# Patient Record
Sex: FEMALE | Race: OTHER | HISPANIC OR LATINO
[De-identification: names, ages, dates, MRNs, and addresses within clinical notes are randomized per-mention and may not be internally consistent; named-entity substitution may affect disease eponyms.]

---

## 2017-06-28 ENCOUNTER — TRANSCRIPTION ENCOUNTER (OUTPATIENT)
Age: 37
End: 2017-06-28

## 2017-07-21 ENCOUNTER — TRANSCRIPTION ENCOUNTER (OUTPATIENT)
Age: 37
End: 2017-07-21

## 2018-07-28 ENCOUNTER — EMERGENCY (EMERGENCY)
Facility: HOSPITAL | Age: 38
LOS: 1 days | Discharge: HOME | End: 2018-07-28
Attending: EMERGENCY MEDICINE
Payer: COMMERCIAL

## 2018-07-28 VITALS
SYSTOLIC BLOOD PRESSURE: 123 MMHG | DIASTOLIC BLOOD PRESSURE: 65 MMHG | OXYGEN SATURATION: 100 % | HEART RATE: 69 BPM | TEMPERATURE: 98 F | RESPIRATION RATE: 17 BRPM

## 2018-07-28 VITALS — HEIGHT: 71 IN | WEIGHT: 225.09 LBS

## 2018-07-28 LAB
ALBUMIN SERPL ELPH-MCNC: 4.1 G/DL — SIGNIFICANT CHANGE UP (ref 3.3–5.2)
ALP SERPL-CCNC: 71 U/L — SIGNIFICANT CHANGE UP (ref 40–120)
ALT FLD-CCNC: 32 U/L — SIGNIFICANT CHANGE UP
ANION GAP SERPL CALC-SCNC: 12 MMOL/L — SIGNIFICANT CHANGE UP (ref 5–17)
APPEARANCE UR: CLEAR — SIGNIFICANT CHANGE UP
AST SERPL-CCNC: 28 U/L — SIGNIFICANT CHANGE UP
BASOPHILS # BLD AUTO: 0 K/UL — SIGNIFICANT CHANGE UP (ref 0–0.2)
BASOPHILS NFR BLD AUTO: 0.3 % — SIGNIFICANT CHANGE UP (ref 0–2)
BILIRUB SERPL-MCNC: 0.4 MG/DL — SIGNIFICANT CHANGE UP (ref 0.4–2)
BILIRUB UR-MCNC: NEGATIVE — SIGNIFICANT CHANGE UP
BUN SERPL-MCNC: 10 MG/DL — SIGNIFICANT CHANGE UP (ref 8–20)
CALCIUM SERPL-MCNC: 9.2 MG/DL — SIGNIFICANT CHANGE UP (ref 8.6–10.2)
CHLORIDE SERPL-SCNC: 102 MMOL/L — SIGNIFICANT CHANGE UP (ref 98–107)
CO2 SERPL-SCNC: 22 MMOL/L — SIGNIFICANT CHANGE UP (ref 22–29)
COLOR SPEC: YELLOW — SIGNIFICANT CHANGE UP
CREAT SERPL-MCNC: 0.6 MG/DL — SIGNIFICANT CHANGE UP (ref 0.5–1.3)
DIFF PNL FLD: ABNORMAL
EOSINOPHIL # BLD AUTO: 0.2 K/UL — SIGNIFICANT CHANGE UP (ref 0–0.5)
EOSINOPHIL NFR BLD AUTO: 2.3 % — SIGNIFICANT CHANGE UP (ref 0–6)
EPI CELLS # UR: SIGNIFICANT CHANGE UP
GLUCOSE SERPL-MCNC: 94 MG/DL — SIGNIFICANT CHANGE UP (ref 70–115)
GLUCOSE UR QL: NEGATIVE MG/DL — SIGNIFICANT CHANGE UP
HCG SERPL-ACNC: 130 MIU/ML — SIGNIFICANT CHANGE UP
HCG UR QL: POSITIVE
HCT VFR BLD CALC: 40.5 % — SIGNIFICANT CHANGE UP (ref 37–47)
HGB BLD-MCNC: 13.5 G/DL — SIGNIFICANT CHANGE UP (ref 12–16)
KETONES UR-MCNC: NEGATIVE — SIGNIFICANT CHANGE UP
LEUKOCYTE ESTERASE UR-ACNC: ABNORMAL
LYMPHOCYTES # BLD AUTO: 28.4 % — SIGNIFICANT CHANGE UP (ref 20–55)
LYMPHOCYTES # BLD AUTO: 3.2 K/UL — SIGNIFICANT CHANGE UP (ref 1–4.8)
MCHC RBC-ENTMCNC: 30.1 PG — SIGNIFICANT CHANGE UP (ref 27–31)
MCHC RBC-ENTMCNC: 33.3 G/DL — SIGNIFICANT CHANGE UP (ref 32–36)
MCV RBC AUTO: 90.4 FL — SIGNIFICANT CHANGE UP (ref 81–99)
MONOCYTES # BLD AUTO: 0.9 K/UL — HIGH (ref 0–0.8)
MONOCYTES NFR BLD AUTO: 7.8 % — SIGNIFICANT CHANGE UP (ref 3–10)
NEUTROPHILS # BLD AUTO: 6.8 K/UL — SIGNIFICANT CHANGE UP (ref 1.8–8)
NEUTROPHILS NFR BLD AUTO: 61 % — SIGNIFICANT CHANGE UP (ref 37–73)
NITRITE UR-MCNC: NEGATIVE — SIGNIFICANT CHANGE UP
PH UR: 6 — SIGNIFICANT CHANGE UP (ref 5–8)
PLATELET # BLD AUTO: 264 K/UL — SIGNIFICANT CHANGE UP (ref 150–400)
POTASSIUM SERPL-MCNC: 3.9 MMOL/L — SIGNIFICANT CHANGE UP (ref 3.5–5.3)
POTASSIUM SERPL-SCNC: 3.9 MMOL/L — SIGNIFICANT CHANGE UP (ref 3.5–5.3)
PROT SERPL-MCNC: 7.7 G/DL — SIGNIFICANT CHANGE UP (ref 6.6–8.7)
PROT UR-MCNC: 15 MG/DL
RBC # BLD: 4.48 M/UL — SIGNIFICANT CHANGE UP (ref 4.4–5.2)
RBC # FLD: 13.4 % — SIGNIFICANT CHANGE UP (ref 11–15.6)
RBC CASTS # UR COMP ASSIST: ABNORMAL /HPF (ref 0–4)
SODIUM SERPL-SCNC: 136 MMOL/L — SIGNIFICANT CHANGE UP (ref 135–145)
SP GR SPEC: 1.02 — SIGNIFICANT CHANGE UP (ref 1.01–1.02)
TYPE + AB SCN PNL BLD: SIGNIFICANT CHANGE UP
UROBILINOGEN FLD QL: NEGATIVE MG/DL — SIGNIFICANT CHANGE UP
WBC # BLD: 11.1 K/UL — HIGH (ref 4.8–10.8)
WBC # FLD AUTO: 11.1 K/UL — HIGH (ref 4.8–10.8)
WBC UR QL: SIGNIFICANT CHANGE UP

## 2018-07-28 PROCEDURE — 81001 URINALYSIS AUTO W/SCOPE: CPT

## 2018-07-28 PROCEDURE — 80053 COMPREHEN METABOLIC PANEL: CPT

## 2018-07-28 PROCEDURE — 81025 URINE PREGNANCY TEST: CPT

## 2018-07-28 PROCEDURE — 86850 RBC ANTIBODY SCREEN: CPT

## 2018-07-28 PROCEDURE — 99284 EMERGENCY DEPT VISIT MOD MDM: CPT

## 2018-07-28 PROCEDURE — 76817 TRANSVAGINAL US OBSTETRIC: CPT | Mod: 26

## 2018-07-28 PROCEDURE — 84702 CHORIONIC GONADOTROPIN TEST: CPT

## 2018-07-28 PROCEDURE — 76801 OB US < 14 WKS SINGLE FETUS: CPT

## 2018-07-28 PROCEDURE — 36415 COLL VENOUS BLD VENIPUNCTURE: CPT

## 2018-07-28 PROCEDURE — 86900 BLOOD TYPING SEROLOGIC ABO: CPT

## 2018-07-28 PROCEDURE — 76801 OB US < 14 WKS SINGLE FETUS: CPT | Mod: 26

## 2018-07-28 PROCEDURE — 85027 COMPLETE CBC AUTOMATED: CPT

## 2018-07-28 PROCEDURE — 86901 BLOOD TYPING SEROLOGIC RH(D): CPT

## 2018-07-28 PROCEDURE — 76817 TRANSVAGINAL US OBSTETRIC: CPT

## 2018-07-28 NOTE — ED PROVIDER NOTE - OBJECTIVE STATEMENT
This patient is a 38 year old woman A5 who presents to the ER c/o vaginal bleeding.  Patient states that 3 days ago she noticed light brown blood when doty wiped after using the bathroom.  She denies heavy vaginal bleeding.  2 days ago she fell on her right side and has been experiencing pelvic pain since the incident.  Patient was seen at [facility] 2 days ago and diagnosed with threatened .  HCG was 65 and ultrasound showed no IUP.

## 2018-07-28 NOTE — ED PROVIDER NOTE - MEDICAL DECISION MAKING DETAILS
Pelvic pain in pregnancy spotting 3 days ago.  Patient well appearing vitals stable.  No critical anemia,  IUP not visualized but  may be too early to visualize.  Patient given instructions to return to the ER immediately if pelvic pain or bleeding worsens, and to follow-up with OB or the ER for repeat HCG and US.

## 2018-07-28 NOTE — ED ADULT NURSE NOTE - OBJECTIVE STATEMENT
patient arrived to ED today with c/o hematuria.  Patient states she took home pregnancy test which was positive.

## 2018-07-28 NOTE — ED ADULT NURSE REASSESSMENT NOTE - NS ED NURSE REASSESS COMMENT FT1
Pt axox3 resting comfortably in PRA R. Pt states x 3 days ago she started to notice spotting with urination when she " wiped her private" Pt then fell x 2 days off the bed while playing with her kids? since has experienced same amount of vag. spotting and abd cramping. Pt seen and dc'd by good manpreet for threatened miscarriage. PT states LMP was about 3 weeks ago and has several pregnancy test  that are positive with her.
Pt axox3 resting comfortable, and has no complaints at this time. Pt to be dc'd home with obgyn follow up and agrees with plan of care. All results given to patient

## 2018-07-30 ENCOUNTER — EMERGENCY (EMERGENCY)
Facility: HOSPITAL | Age: 38
LOS: 1 days | Discharge: HOME | End: 2018-07-30
Attending: EMERGENCY MEDICINE
Payer: COMMERCIAL

## 2018-07-30 VITALS — WEIGHT: 225.09 LBS | HEIGHT: 69 IN

## 2018-07-30 LAB
ALBUMIN SERPL ELPH-MCNC: 4.4 G/DL — SIGNIFICANT CHANGE UP (ref 3.3–5.2)
ALP SERPL-CCNC: 66 U/L — SIGNIFICANT CHANGE UP (ref 40–120)
ALT FLD-CCNC: 29 U/L — SIGNIFICANT CHANGE UP
ANION GAP SERPL CALC-SCNC: 11 MMOL/L — SIGNIFICANT CHANGE UP (ref 5–17)
APPEARANCE UR: ABNORMAL
AST SERPL-CCNC: 24 U/L — SIGNIFICANT CHANGE UP
BASOPHILS # BLD AUTO: 0 K/UL — SIGNIFICANT CHANGE UP (ref 0–0.2)
BASOPHILS NFR BLD AUTO: 0.3 % — SIGNIFICANT CHANGE UP (ref 0–2)
BILIRUB SERPL-MCNC: 0.3 MG/DL — LOW (ref 0.4–2)
BILIRUB UR-MCNC: NEGATIVE — SIGNIFICANT CHANGE UP
BUN SERPL-MCNC: 10 MG/DL — SIGNIFICANT CHANGE UP (ref 8–20)
CALCIUM SERPL-MCNC: 9.3 MG/DL — SIGNIFICANT CHANGE UP (ref 8.6–10.2)
CHLORIDE SERPL-SCNC: 102 MMOL/L — SIGNIFICANT CHANGE UP (ref 98–107)
CO2 SERPL-SCNC: 26 MMOL/L — SIGNIFICANT CHANGE UP (ref 22–29)
COLOR SPEC: YELLOW — SIGNIFICANT CHANGE UP
COMMENT - URINE: SIGNIFICANT CHANGE UP
CREAT SERPL-MCNC: 0.71 MG/DL — SIGNIFICANT CHANGE UP (ref 0.5–1.3)
DIFF PNL FLD: ABNORMAL
EOSINOPHIL # BLD AUTO: 0.4 K/UL — SIGNIFICANT CHANGE UP (ref 0–0.5)
EOSINOPHIL NFR BLD AUTO: 2.9 % — SIGNIFICANT CHANGE UP (ref 0–6)
EPI CELLS # UR: ABNORMAL
GLUCOSE SERPL-MCNC: 87 MG/DL — SIGNIFICANT CHANGE UP (ref 70–115)
GLUCOSE UR QL: NEGATIVE MG/DL — SIGNIFICANT CHANGE UP
HCG SERPL-ACNC: 162.8 MIU/ML — SIGNIFICANT CHANGE UP
HCT VFR BLD CALC: 38.9 % — SIGNIFICANT CHANGE UP (ref 37–47)
HGB BLD-MCNC: 13.1 G/DL — SIGNIFICANT CHANGE UP (ref 12–16)
KETONES UR-MCNC: NEGATIVE — SIGNIFICANT CHANGE UP
LEUKOCYTE ESTERASE UR-ACNC: ABNORMAL
LYMPHOCYTES # BLD AUTO: 31.3 % — SIGNIFICANT CHANGE UP (ref 20–55)
LYMPHOCYTES # BLD AUTO: 4.5 K/UL — SIGNIFICANT CHANGE UP (ref 1–4.8)
MCHC RBC-ENTMCNC: 30.5 PG — SIGNIFICANT CHANGE UP (ref 27–31)
MCHC RBC-ENTMCNC: 33.7 G/DL — SIGNIFICANT CHANGE UP (ref 32–36)
MCV RBC AUTO: 90.7 FL — SIGNIFICANT CHANGE UP (ref 81–99)
MONOCYTES # BLD AUTO: 1.2 K/UL — HIGH (ref 0–0.8)
MONOCYTES NFR BLD AUTO: 8 % — SIGNIFICANT CHANGE UP (ref 3–10)
NEUTROPHILS # BLD AUTO: 8.2 K/UL — HIGH (ref 1.8–8)
NEUTROPHILS NFR BLD AUTO: 57.2 % — SIGNIFICANT CHANGE UP (ref 37–73)
NITRITE UR-MCNC: NEGATIVE — SIGNIFICANT CHANGE UP
PH UR: 8 — SIGNIFICANT CHANGE UP (ref 5–8)
PLATELET # BLD AUTO: 274 K/UL — SIGNIFICANT CHANGE UP (ref 150–400)
POTASSIUM SERPL-MCNC: 3.8 MMOL/L — SIGNIFICANT CHANGE UP (ref 3.5–5.3)
POTASSIUM SERPL-SCNC: 3.8 MMOL/L — SIGNIFICANT CHANGE UP (ref 3.5–5.3)
PROT SERPL-MCNC: 7.4 G/DL — SIGNIFICANT CHANGE UP (ref 6.6–8.7)
PROT UR-MCNC: 30 MG/DL
RBC # BLD: 4.29 M/UL — LOW (ref 4.4–5.2)
RBC # FLD: 13.5 % — SIGNIFICANT CHANGE UP (ref 11–15.6)
RBC CASTS # UR COMP ASSIST: ABNORMAL /HPF (ref 0–4)
SODIUM SERPL-SCNC: 139 MMOL/L — SIGNIFICANT CHANGE UP (ref 135–145)
SP GR SPEC: 1.02 — SIGNIFICANT CHANGE UP (ref 1.01–1.02)
UROBILINOGEN FLD QL: NEGATIVE MG/DL — SIGNIFICANT CHANGE UP
WBC # BLD: 14.4 K/UL — HIGH (ref 4.8–10.8)
WBC # FLD AUTO: 14.4 K/UL — HIGH (ref 4.8–10.8)
WBC UR QL: SIGNIFICANT CHANGE UP

## 2018-07-30 PROCEDURE — 99284 EMERGENCY DEPT VISIT MOD MDM: CPT

## 2018-07-30 RX ORDER — SODIUM CHLORIDE 9 MG/ML
1000 INJECTION INTRAMUSCULAR; INTRAVENOUS; SUBCUTANEOUS ONCE
Qty: 0 | Refills: 0 | Status: COMPLETED | OUTPATIENT
Start: 2018-07-30 | End: 2018-07-30

## 2018-07-30 RX ADMIN — SODIUM CHLORIDE 1000 MILLILITER(S): 9 INJECTION INTRAMUSCULAR; INTRAVENOUS; SUBCUTANEOUS at 22:53

## 2018-07-30 NOTE — ED PROVIDER NOTE - PHYSICAL EXAMINATION
Const: Awake, alert and oriented. In no acute distress. Well appearing.  HEENT: NC/AT. Moist mucous membranes.  Eyes: No scleral icterus. EOMI.  Neck:. Soft and supple. Full ROM without pain.  Cardiac:  +S1/S2. No murmurs. Peripheral pulses 2+ and symmetric.   Resp: Speaking in full sentences. No evidence of respiratory distress. No wheezes, rales or rhonchi.  Abd: Soft, lower suprapubic tenderness on palpation, non-distended. Normal bowel sounds in all 4 quadrants. No guarding or rebound.  Back: Spine midline and non-tender. No CVAT.  Skin: No rashes, abrasions or lacerations.  Lymph: No cervical lymphadenopathy.  Neuro: Awake, alert & oriented x 3. Moves all extremities symmetrically.

## 2018-07-30 NOTE — ED PROVIDER NOTE - PROGRESS NOTE DETAILS
ob/gyn consulted after reviewing ultrasound, lab work, and urine ob/gyn saw patient, copy of results printed for pt, pt explained results and d/c instructions

## 2018-07-30 NOTE — ED PROVIDER NOTE - OBJECTIVE STATEMENT
Patient is a 39 y/o female c/o of vaginal bleeding A5 c/o of vaginal bleeding that onset around one week ago. Patient states bleeding has become heavier in the past two days. Patient was seen at [facility] two days ago for the same complaint and dx with threatened . Patient states she is also having lower pelvic pain and states she fell four days ago on her right side, having pain in pelvic region every since. Patient denies cp, SOB, dysuria, nausea, vomiting, fevers.

## 2018-07-31 VITALS
DIASTOLIC BLOOD PRESSURE: 74 MMHG | TEMPERATURE: 98 F | SYSTOLIC BLOOD PRESSURE: 108 MMHG | HEART RATE: 77 BPM | RESPIRATION RATE: 19 BRPM | OXYGEN SATURATION: 97 %

## 2018-07-31 DIAGNOSIS — O20.0 THREATENED ABORTION: ICD-10-CM

## 2018-07-31 LAB
BLD GP AB SCN SERPL QL: SIGNIFICANT CHANGE UP
TYPE + AB SCN PNL BLD: SIGNIFICANT CHANGE UP

## 2018-07-31 PROCEDURE — 80053 COMPREHEN METABOLIC PANEL: CPT

## 2018-07-31 PROCEDURE — 99284 EMERGENCY DEPT VISIT MOD MDM: CPT

## 2018-07-31 PROCEDURE — 81001 URINALYSIS AUTO W/SCOPE: CPT

## 2018-07-31 PROCEDURE — 86900 BLOOD TYPING SEROLOGIC ABO: CPT

## 2018-07-31 PROCEDURE — 84702 CHORIONIC GONADOTROPIN TEST: CPT

## 2018-07-31 PROCEDURE — 86901 BLOOD TYPING SEROLOGIC RH(D): CPT

## 2018-07-31 PROCEDURE — 85027 COMPLETE CBC AUTOMATED: CPT

## 2018-07-31 PROCEDURE — 87086 URINE CULTURE/COLONY COUNT: CPT

## 2018-07-31 PROCEDURE — 76815 OB US LIMITED FETUS(S): CPT

## 2018-07-31 PROCEDURE — 76801 OB US < 14 WKS SINGLE FETUS: CPT | Mod: 26

## 2018-07-31 PROCEDURE — 86850 RBC ANTIBODY SCREEN: CPT

## 2018-07-31 PROCEDURE — 36415 COLL VENOUS BLD VENIPUNCTURE: CPT

## 2018-07-31 PROCEDURE — 76801 OB US < 14 WKS SINGLE FETUS: CPT

## 2018-07-31 NOTE — CONSULT NOTE ADULT - PROBLEM SELECTOR RECOMMENDATION 9
Please follow up with your clinic appointment tomorrow. Take a copy of your labwork and sonogram results as well. You will need to repeat the HCG quant and sonogram to determine if the fetus is growing appropriately.

## 2018-07-31 NOTE — CONSULT NOTE ADULT - ASSESSMENT
39yo  at 3w0d by LMP of 7/10/18 with threatened AB. H/H stable and VS WNL. No signs of acute abdomen. BHCG is inappropriately rising from 130 to 162, however, given the US results, it is still too early to determine the viability of this pregnancy.   Vaginal bleeding likely due to implantation bleeding.   Pt has an appointment with her clinic tomorrow morning. She should inform them of the results of the labwork and sonogram and follow up with them as necessary.

## 2018-07-31 NOTE — CONSULT NOTE ADULT - SUBJECTIVE AND OBJECTIVE BOX
GREGORIO STUART is a 37yo  at 3w0d by LMP of 7/10/18 returns to the ED with complaints of crampy abdominal pain and vaginal bleeding. 5 days ago, she was seen at [facility] for similar complaints - HCG 65 and no IUP. Pt was seen in our ED 2 days ago for similar complaints -  and no IUP. She came back today because she noticed more bleeding, states she saw some fresh and old blood while wiping. Has gone through 1 pad in one day. Denies dysuria, frequency, nausea, vomiting, fever, or changes in bowel movements.     PAST OBSTETRICAL HISTORY: eTOP x5, treated with D&C. 2x     PAST GYN HISTORY: Denies history of abnormal paps, STDs, or uterine fibroids.     PAST MEDICAL HISTORY:  No pertinent past medical history    PAST SURGICAL HISTORY:  D&C x5, dates vary from 3727-7001    MEDS: none    SOCIAL:  Denies tobacco, alcohol, or drug use. Feels safe at home.     Allergies: No Known Allergies    Intolerances    Vital Signs Last 24 Hrs  T(C): 36.8 (2018 21:08), Max: 36.8 (2018 21:08)  T(F): 98.3 (2018 21:08), Max: 98.3 (2018 21:08)  HR: 82 (2018 21:08) (82 - 82)  BP: 104/69 (2018 21:08) (104/69 - 104/69)  BP(mean): --  RR: 18 (2018 21:08) (18 - 18)  SpO2: 96% (2018 21:08) (96% - 96%)    PHYSICAL EXAM:  GENERAL: NAD  CHEST/LUNG: Clear to percussion bilaterally; No rales, rhonchi, wheezing, or rubs  HEART: S1S2, RRR  ABDOMEN: Soft, Nontender, Nondistended; Bowel sounds present, no rebounding or guarding    LABS:                        13.1   14.4  )-----------( 274      ( 2018 23:13 )             38.9     07-30    139  |  102  |  10.0  ----------------------------<  87  3.8   |  26.0  |  0.71    Ca    9.3      2018 23:13    TPro  7.4  /  Alb  4.4  /  TBili  0.3<L>  /  DBili  x   /  AST  24  /  ALT  29  /  AlkPhos  66  07-30    Urinalysis Basic - ( 2018 22:46 )    Color: Yellow / Appearance: Slightly Turbid / S.020 / pH: x  Gluc: x / Ketone: Negative  / Bili: Negative / Urobili: Negative mg/dL   Blood: x / Protein: 30 mg/dL / Nitrite: Negative   Leuk Esterase: Small / RBC: 3-5 /HPF / WBC 3-5   Sq Epi: x / Non Sq Epi: Moderate / Bacteria: x    HCG Quantitative, Serum (18 @ 23:13)    HCG Quantitative, Serum: 162.8      HCG Quantitative, Serum (18 @ 12:07)    HCG Quantitative, Serum: 130.0    RADIOLOGY STUDIES:  < from: US Echo OB <=14 Weeks, First Gestation (18 @ 00:07) >  IMPRESSION:  Too small to characterize cystic focus measuring up to 2 mm in the   endometrialecho complex without fetal pole identified. Differential   includes early pregnancy, missed  or ectopic pregnancy though no   adnexal masses are demonstrated. Correlate with serial beta-hCG and   short-term imaging follow-up is advised.    < end of copied text >

## 2018-07-31 NOTE — ED ADULT NURSE REASSESSMENT NOTE - NS ED NURSE REASSESS COMMENT FT1
OB at bedside
pt feels better ready for discharge, IV removed, VSS, discharge instructions reviewed, pt indicated understanding

## 2018-08-01 LAB
CULTURE RESULTS: SIGNIFICANT CHANGE UP
SPECIMEN SOURCE: SIGNIFICANT CHANGE UP

## 2018-08-02 RX ORDER — CEPHALEXIN 500 MG
1 CAPSULE ORAL
Qty: 28 | Refills: 0
Start: 2018-08-02 | End: 2018-08-08

## 2018-08-02 NOTE — ED POST DISCHARGE NOTE - RESULT SUMMARY
+ bacteria in urine C&S, I s/w patient, she is aware that she needs to f/u with GYN, keflex qid x 7 days sent to pharmacy.

## 2018-08-05 ENCOUNTER — EMERGENCY (EMERGENCY)
Facility: HOSPITAL | Age: 38
LOS: 1 days | Discharge: HOME | End: 2018-08-05
Attending: EMERGENCY MEDICINE
Payer: COMMERCIAL

## 2018-08-05 VITALS
TEMPERATURE: 99 F | RESPIRATION RATE: 18 BRPM | OXYGEN SATURATION: 96 % | DIASTOLIC BLOOD PRESSURE: 66 MMHG | SYSTOLIC BLOOD PRESSURE: 121 MMHG | HEART RATE: 11 BPM

## 2018-08-05 VITALS — HEIGHT: 66 IN | WEIGHT: 229.94 LBS

## 2018-08-05 DIAGNOSIS — Q36.0 CLEFT LIP, BILATERAL: Chronic | ICD-10-CM

## 2018-08-05 PROCEDURE — 99283 EMERGENCY DEPT VISIT LOW MDM: CPT | Mod: 25

## 2018-08-05 PROCEDURE — 10061 I&D ABSCESS COMP/MULTIPLE: CPT

## 2018-08-05 RX ADMIN — Medication 1 TABLET(S): at 22:23

## 2018-08-05 NOTE — ED PROVIDER NOTE - MUSCULOSKELETAL, MLM
Spine appears normal, range of motion is not limited, no muscle or joint tenderness. Large abscess in L groin.

## 2018-08-05 NOTE — ED ADULT TRIAGE NOTE - CHIEF COMPLAINT QUOTE
"I have groin pain on my left side that started Friday and I have a hard time sitting" Pt denies injury,. PT A & Ox4.

## 2018-08-05 NOTE — ED ADULT NURSE NOTE - NSIMPLEMENTINTERV_GEN_ALL_ED
Implemented All Universal Safety Interventions:  [place] to call system. Call bell, personal items and telephone within reach. Instruct patient to call for assistance. Room bathroom lighting operational. Non-slip footwear when patient is off stretcher. Physically safe environment: no spills, clutter or unnecessary equipment. Stretcher in lowest position, wheels locked, appropriate side rails in place.

## 2018-08-05 NOTE — ED PROVIDER NOTE - OBJECTIVE STATEMENT
37 y/o F pt with medical hx of cleft palette presents to ED c/o lump in L groin. Pt reports that she had a small bump 2 days ago, and started scratching it, she then realized it starting to become swollen. No further complaints at this time.

## 2018-08-07 ENCOUNTER — EMERGENCY (EMERGENCY)
Facility: HOSPITAL | Age: 38
LOS: 1 days | Discharge: HOME | End: 2018-08-07
Attending: EMERGENCY MEDICINE
Payer: COMMERCIAL

## 2018-08-07 VITALS
HEART RATE: 74 BPM | OXYGEN SATURATION: 96 % | SYSTOLIC BLOOD PRESSURE: 116 MMHG | TEMPERATURE: 99 F | DIASTOLIC BLOOD PRESSURE: 77 MMHG | RESPIRATION RATE: 18 BRPM

## 2018-08-07 VITALS — HEIGHT: 69 IN | WEIGHT: 225.09 LBS

## 2018-08-07 DIAGNOSIS — Q36.0 CLEFT LIP, BILATERAL: Chronic | ICD-10-CM

## 2018-08-07 PROCEDURE — 99282 EMERGENCY DEPT VISIT SF MDM: CPT

## 2018-08-07 NOTE — ED PROVIDER NOTE - ATTENDING CONTRIBUTION TO CARE
female patient seen and evaluated with ACP  Presents to Ed for packing removal  seen at this ED, I&D of abscess with packing at that time  no fever, chills  vitals reviewed, exam as documented  packing removed, tolerated well  f/u as instructed

## 2018-08-07 NOTE — ED PROVIDER NOTE - MEDICAL DECISION MAKING DETAILS
PT with stable VS no acute distress, improving wound pt using warm soaks taking abx has follow up for 2/28 no actively draining no surrounding cellulitis, packing removed pt wll be Dc home with contiued wound care, educated about when to return to the ED if needed. PT verbalizes that he understands all instructions and results.

## 2018-08-07 NOTE — ED PROVIDER NOTE - OBJECTIVE STATEMENT
PT with no SPMHx presents to the ED with complaint of packing removal of abscess that was drained in [facility] 2 days ago. PT stats that she had a abscess that was slowly developing I&D she has followed post procedure care as instructed with warm compressions, ABx. PT states that she has had small amount of drainage from the wound, has had a decreased amount of swelling and redness at the sight. PT denies fever, chills, weakness, numbness, tingling, HA, dizziness, n/v.

## 2018-11-21 ENCOUNTER — APPOINTMENT (OUTPATIENT)
Dept: OBGYN | Facility: CLINIC | Age: 38
End: 2018-11-21
Payer: MEDICAID

## 2018-11-21 DIAGNOSIS — Z78.9 OTHER SPECIFIED HEALTH STATUS: ICD-10-CM

## 2018-11-21 LAB
HCG UR QL: NEGATIVE
QUALITY CONTROL: YES

## 2018-11-21 PROCEDURE — 81025 URINE PREGNANCY TEST: CPT

## 2018-11-21 PROCEDURE — 99203 OFFICE O/P NEW LOW 30 MIN: CPT | Mod: TH

## 2018-11-27 LAB
C TRACH RRNA SPEC QL NAA+PROBE: NOT DETECTED
CYTOLOGY CVX/VAG DOC THIN PREP: NORMAL
CYTOLOGY CVX/VAG DOC THIN PREP: NORMAL
HPV HIGH+LOW RISK DNA PNL CVX: NOT DETECTED
N GONORRHOEA RRNA SPEC QL NAA+PROBE: NOT DETECTED
SOURCE TP AMPLIFICATION: NORMAL

## 2018-11-29 ENCOUNTER — ASOB RESULT (OUTPATIENT)
Age: 38
End: 2018-11-29

## 2018-11-29 ENCOUNTER — APPOINTMENT (OUTPATIENT)
Dept: ANTEPARTUM | Facility: CLINIC | Age: 38
End: 2018-11-29
Payer: MEDICAID

## 2018-11-29 PROCEDURE — 76817 TRANSVAGINAL US OBSTETRIC: CPT

## 2018-11-30 ENCOUNTER — MESSAGE (OUTPATIENT)
Age: 38
End: 2018-11-30

## 2018-11-30 ENCOUNTER — EMERGENCY (EMERGENCY)
Facility: HOSPITAL | Age: 38
LOS: 1 days | Discharge: HOME | End: 2018-11-30
Attending: EMERGENCY MEDICINE
Payer: COMMERCIAL

## 2018-11-30 VITALS
OXYGEN SATURATION: 97 % | RESPIRATION RATE: 18 BRPM | WEIGHT: 225.09 LBS | HEART RATE: 85 BPM | DIASTOLIC BLOOD PRESSURE: 77 MMHG | HEIGHT: 69 IN | TEMPERATURE: 98 F | SYSTOLIC BLOOD PRESSURE: 121 MMHG

## 2018-11-30 DIAGNOSIS — Q36.0 CLEFT LIP, BILATERAL: Chronic | ICD-10-CM

## 2018-11-30 LAB
ALBUMIN SERPL ELPH-MCNC: 4.5 G/DL — SIGNIFICANT CHANGE UP (ref 3.3–5.2)
ALP SERPL-CCNC: 71 U/L — SIGNIFICANT CHANGE UP (ref 40–120)
ALT FLD-CCNC: 25 U/L — SIGNIFICANT CHANGE UP
ANION GAP SERPL CALC-SCNC: 12 MMOL/L — SIGNIFICANT CHANGE UP (ref 5–17)
APPEARANCE UR: ABNORMAL
AST SERPL-CCNC: 23 U/L — SIGNIFICANT CHANGE UP
BACTERIA # UR AUTO: ABNORMAL
BASOPHILS # BLD AUTO: 0 K/UL — SIGNIFICANT CHANGE UP (ref 0–0.2)
BASOPHILS NFR BLD AUTO: 0.3 % — SIGNIFICANT CHANGE UP (ref 0–2)
BILIRUB SERPL-MCNC: 0.3 MG/DL — LOW (ref 0.4–2)
BILIRUB UR-MCNC: NEGATIVE — SIGNIFICANT CHANGE UP
BLD GP AB SCN SERPL QL: SIGNIFICANT CHANGE UP
BUN SERPL-MCNC: 9 MG/DL — SIGNIFICANT CHANGE UP (ref 8–20)
CALCIUM SERPL-MCNC: 9.8 MG/DL — SIGNIFICANT CHANGE UP (ref 8.6–10.2)
CHLORIDE SERPL-SCNC: 103 MMOL/L — SIGNIFICANT CHANGE UP (ref 98–107)
CO2 SERPL-SCNC: 27 MMOL/L — SIGNIFICANT CHANGE UP (ref 22–29)
COD CRY URNS QL: ABNORMAL
COLOR SPEC: YELLOW — SIGNIFICANT CHANGE UP
COMMENT - URINE: SIGNIFICANT CHANGE UP
CREAT SERPL-MCNC: 0.71 MG/DL — SIGNIFICANT CHANGE UP (ref 0.5–1.3)
DIFF PNL FLD: ABNORMAL
EOSINOPHIL # BLD AUTO: 0.2 K/UL — SIGNIFICANT CHANGE UP (ref 0–0.5)
EOSINOPHIL NFR BLD AUTO: 2.1 % — SIGNIFICANT CHANGE UP (ref 0–6)
EPI CELLS # UR: SIGNIFICANT CHANGE UP
GLUCOSE SERPL-MCNC: 94 MG/DL — SIGNIFICANT CHANGE UP (ref 70–115)
GLUCOSE UR QL: NEGATIVE MG/DL — SIGNIFICANT CHANGE UP
HCG SERPL-ACNC: 2555 MIU/ML — HIGH
HCT VFR BLD CALC: 38.2 % — SIGNIFICANT CHANGE UP (ref 37–47)
HGB BLD-MCNC: 12.6 G/DL — SIGNIFICANT CHANGE UP (ref 12–16)
KETONES UR-MCNC: NEGATIVE — SIGNIFICANT CHANGE UP
LEUKOCYTE ESTERASE UR-ACNC: ABNORMAL
LYMPHOCYTES # BLD AUTO: 29.6 % — SIGNIFICANT CHANGE UP (ref 20–55)
LYMPHOCYTES # BLD AUTO: 3.6 K/UL — SIGNIFICANT CHANGE UP (ref 1–4.8)
MCHC RBC-ENTMCNC: 30.4 PG — SIGNIFICANT CHANGE UP (ref 27–31)
MCHC RBC-ENTMCNC: 33 G/DL — SIGNIFICANT CHANGE UP (ref 32–36)
MCV RBC AUTO: 92 FL — SIGNIFICANT CHANGE UP (ref 81–99)
MONOCYTES # BLD AUTO: 1 K/UL — HIGH (ref 0–0.8)
MONOCYTES NFR BLD AUTO: 8.6 % — SIGNIFICANT CHANGE UP (ref 3–10)
NEUTROPHILS # BLD AUTO: 7.2 K/UL — SIGNIFICANT CHANGE UP (ref 1.8–8)
NEUTROPHILS NFR BLD AUTO: 59.2 % — SIGNIFICANT CHANGE UP (ref 37–73)
NITRITE UR-MCNC: NEGATIVE — SIGNIFICANT CHANGE UP
PH UR: 6 — SIGNIFICANT CHANGE UP (ref 5–8)
PLATELET # BLD AUTO: 256 K/UL — SIGNIFICANT CHANGE UP (ref 150–400)
POTASSIUM SERPL-MCNC: 3.9 MMOL/L — SIGNIFICANT CHANGE UP (ref 3.5–5.3)
POTASSIUM SERPL-SCNC: 3.9 MMOL/L — SIGNIFICANT CHANGE UP (ref 3.5–5.3)
PROT SERPL-MCNC: 7.6 G/DL — SIGNIFICANT CHANGE UP (ref 6.6–8.7)
PROT UR-MCNC: 30 MG/DL
RBC # BLD: 4.15 M/UL — LOW (ref 4.4–5.2)
RBC # FLD: 14.2 % — SIGNIFICANT CHANGE UP (ref 11–15.6)
RBC CASTS # UR COMP ASSIST: ABNORMAL /HPF (ref 0–4)
SODIUM SERPL-SCNC: 142 MMOL/L — SIGNIFICANT CHANGE UP (ref 135–145)
SP GR SPEC: 1.02 — SIGNIFICANT CHANGE UP (ref 1.01–1.02)
TYPE + AB SCN PNL BLD: SIGNIFICANT CHANGE UP
UROBILINOGEN FLD QL: 1 MG/DL
WBC # BLD: 12.2 K/UL — HIGH (ref 4.8–10.8)
WBC # FLD AUTO: 12.2 K/UL — HIGH (ref 4.8–10.8)
WBC UR QL: SIGNIFICANT CHANGE UP

## 2018-11-30 PROCEDURE — 99284 EMERGENCY DEPT VISIT MOD MDM: CPT

## 2018-11-30 PROCEDURE — 76817 TRANSVAGINAL US OBSTETRIC: CPT | Mod: 26

## 2018-11-30 PROCEDURE — 76801 OB US < 14 WKS SINGLE FETUS: CPT | Mod: 26

## 2018-11-30 RX ORDER — ACETAMINOPHEN 500 MG
975 TABLET ORAL ONCE
Qty: 0 | Refills: 0 | Status: COMPLETED | OUTPATIENT
Start: 2018-11-30 | End: 2018-11-30

## 2018-11-30 RX ADMIN — Medication 975 MILLIGRAM(S): at 22:38

## 2018-11-30 RX ADMIN — Medication 975 MILLIGRAM(S): at 20:22

## 2018-11-30 NOTE — ED STATDOCS - OBJECTIVE STATEMENT
39 y/o F pt 5 weeks pregnant with PMHx  with miscarriage x1, clef palat repair presents to the ED c/o sudden onset vaginal bleeding today.  Pt notes associated suprapubic cramping.  Pt has known pregnancy by urine and US.  She last saw her GYN yesterday in his office and had an US.  She is taking pre- medications.  Pt is tolerating PO, last ate at 16:30 today.  Denies fever, chills, N/V/D, dysuria.  No further acute complaints at this time.  OB/GYN: Dr. Olsen

## 2018-11-30 NOTE — ED STATDOCS - ATTENDING CONTRIBUTION TO CARE
I, Arnulfo Rangel, performed the initial face to face bedside interview with this patient regarding history of present illness, review of symptoms and relevant past medical, social and family history.  I completed an independent physical examination.  I was the initial provider who evaluated this patient. I have signed out the follow up of any pending tests (i.e. labs, radiological studies) to the ACP.  I have communicated the patient’s plan of care and disposition with the ACP.

## 2018-11-30 NOTE — ED STATDOCS - MEDICAL DECISION MAKING DETAILS
suprapubic cramping/vag spotting, know pregnancy, about 5 weeks by date, line, labs, HCG/US to eval for early/ectopic/threatened, type and screen for rh incompatibility, check results, reassess, discuss with Dr. Olsen

## 2018-11-30 NOTE — ED STATDOCS - PHYSICAL EXAMINATION
Constitutional: in no apparent distress, speaking in full sentences  HEENT: neck supple, FROM, tongue is pink, moist and midline  EYES: non-injected, non-icteric, PERRL, EOMI  RESPIRATORY: lungs clear  CARDIAC: S1 S2, regular rate, moving chest wall symmetrically, no pedal edema  GI: bowel sounds present, abdomen soft, L suprapubic tenderness with deep palpation, no rebound, no guarding  : no CVA tenderness  MSK: spine is midline, no calf tenderness  SKIN: no jaundice  NEURO: awake and alert

## 2018-11-30 NOTE — ED STATDOCS - PROGRESS NOTE DETAILS
PA Note: Pt evaluated at bedside, c/o vaginal bleeding and suprapubic cramping today (11/30/18) since 6:30pm.  Pt states she is not currently bleeding and the bleeding was limited to when she wiped with toilet paper.  Pt unsure of date of LMP, did not take any medications for pain today, denies new sexual partners.  Pt states her US was normal yesterday at OB/GYN. Pt evaluated prior by intake physician. Otherwise HPI/PE/ROS as noted above. Will follow up plan per intake physician Patient signed out to AUGIE Mcdonald pt has  US done - IUP- + UTI/ keflex and UC ordered - called OB resident Dr Boston  who spoke with DR bryant will F/u consult as per OB resident pt  can be D/c F/u in clinic 2 days with perceptions- will Tx the UTI

## 2018-11-30 NOTE — ED STATDOCS - HISTORY ATTESTATION, MLM
I have reviewed and confirmed nurses' notes... I personally performed the service described in the documentation recorded by the scribe in my presence, and it accurately and completely records my words and actions.

## 2018-11-30 NOTE — ED ADULT NURSE NOTE - NSIMPLEMENTINTERV_GEN_ALL_ED
Implemented All Universal Safety Interventions:  [place] to call system. Call bell, personal items and telephone within reach. Instruct patient to call for assistance. Room bathroom lighting operational. Non-slip footwear when patient is off stretcher. Physically safe environment: no spills, clutter or unnecessary equipment. Stretcher in lowest position, wheels locked, appropriate side rails in place.

## 2018-11-30 NOTE — ED STATDOCS - CARE PLAN
Principal Discharge DX:	Vaginal bleeding affecting early pregnancy  Secondary Diagnosis:	Urinary tract infection with hematuria, site unspecified

## 2018-12-01 VITALS
SYSTOLIC BLOOD PRESSURE: 120 MMHG | RESPIRATION RATE: 18 BRPM | HEART RATE: 78 BPM | OXYGEN SATURATION: 99 % | DIASTOLIC BLOOD PRESSURE: 68 MMHG

## 2018-12-01 DIAGNOSIS — O02.1 MISSED ABORTION: ICD-10-CM

## 2018-12-01 PROCEDURE — 86901 BLOOD TYPING SEROLOGIC RH(D): CPT

## 2018-12-01 PROCEDURE — 87086 URINE CULTURE/COLONY COUNT: CPT

## 2018-12-01 PROCEDURE — 86850 RBC ANTIBODY SCREEN: CPT

## 2018-12-01 PROCEDURE — 36415 COLL VENOUS BLD VENIPUNCTURE: CPT

## 2018-12-01 PROCEDURE — 76817 TRANSVAGINAL US OBSTETRIC: CPT

## 2018-12-01 PROCEDURE — 86900 BLOOD TYPING SEROLOGIC ABO: CPT

## 2018-12-01 PROCEDURE — 99284 EMERGENCY DEPT VISIT MOD MDM: CPT

## 2018-12-01 PROCEDURE — 76801 OB US < 14 WKS SINGLE FETUS: CPT

## 2018-12-01 PROCEDURE — 80053 COMPREHEN METABOLIC PANEL: CPT

## 2018-12-01 PROCEDURE — 84702 CHORIONIC GONADOTROPIN TEST: CPT

## 2018-12-01 PROCEDURE — 85027 COMPLETE CBC AUTOMATED: CPT

## 2018-12-01 PROCEDURE — 81001 URINALYSIS AUTO W/SCOPE: CPT

## 2018-12-01 RX ORDER — CEPHALEXIN 500 MG
1 CAPSULE ORAL
Qty: 21 | Refills: 0
Start: 2018-12-01 | End: 2018-12-07

## 2018-12-01 RX ORDER — CEPHALEXIN 500 MG
500 CAPSULE ORAL ONCE
Qty: 0 | Refills: 0 | Status: COMPLETED | OUTPATIENT
Start: 2018-12-01 | End: 2018-12-01

## 2018-12-01 RX ADMIN — Medication 500 MILLIGRAM(S): at 00:52

## 2018-12-01 NOTE — CONSULT NOTE ADULT - SUBJECTIVE AND OBJECTIVE BOX
38y    Last Menstrual Period 2018  presents with sudden onset vaginal spotting today. Saw it twice when wiping after urinating. Denies weakness or lightheadedness. Reports mild intermittent lower abdominal pain.     PAST MEDICAL HISTORY:  No pertinent past medical history    PAST SURGICAL HISTORY:  Bilateral cleft lip, complete: during childhood  No significant past surgical history    PAST OBSTETRICAL HISTORY: VD x2, no complications. SAB 3 months ago.     PAST GYN HISTORY: none    SOCIAL:  never smoker, social drinker, no history of recreational drug use     No Known Allergies    Vital Signs Last 24 Hrs  T(C): 36.4 (2018 17:55), Max: 36.4 (2018 17:55)  T(F): 97.6 (2018 17:55), Max: 97.6 (2018 17:55)  HR: 85 (2018 17:55) (85 - 85)  BP: 121/77 (2018 17:55) (121/77 - 121/77)  RR: 18 (2018 17:55) (18 - 18)  SpO2: 97% (2018 17:55) (97% - 97%)    PHYSICAL EXAM:  GENERAL: NAD  ABDOMEN: Soft, Nontender, Nondistended; Bowel sounds present  PELVIC:        EXTERNAL GENITALIA: grossly normal         VAGINA: minimal old blood in the vaginal vault         CERVIX: closed, no active bleeding        UTERUS: non-enlarged, nontender,  firm        ADNEXA: nontender    LABS:  HCG Quantitative, Serum (18 @ 20:20)    HCG Quantitative, Serum: 2555.0                        12.6   12.2  )-----------( 256      ( 2018 20:20 )             38.2         142  |  103  |  9.0  ----------------------------<  94  3.9   |  27.0  |  0.71    Ca    9.8      2018 20:20    TPro  7.6  /  Alb  4.5  /  TBili  0.3<L>  /  DBili  x   /  AST  23  /  ALT  25  /  AlkPhos  71      Urinalysis Basic - ( 2018 20:18 )    Color: Yellow / Appearance: Slightly Turbid / S.025 / pH: x  Gluc: x / Ketone: Negative  / Bili: Negative / Urobili: 1 mg/dL   Blood: x / Protein: 30 mg/dL / Nitrite: Negative   Leuk Esterase: Small / RBC: 3-5 /HPF / WBC 0-2   Sq Epi: x / Non Sq Epi: Occasional / Bacteria: Occasional    RADIOLOGY STUDIES:  < from: US Echo Transvaginal, OB (.30.18 @ 22:26) >     EXAM:  US OB LES THAN 14 WKS 1ST GEST                         EXAM:  US OB TRANSVAGINAL                          PROCEDURE DATE:  2018      INTERPRETATION:  CLINICAL INFORMATION: Abdominal cramping and vaginal   spotting x1 day.    LMP: 10/10/2018    Estimated Gestational Age by LMP: 7 weeks and 2 days    COMPARISON: Pelvic sonogram of 2018.    Endovaginal and transabdominal pelvic sonogram. Color and Spectral   Doppler was performed.    FINDINGS:    Uterus: Single intrauterine gestation. Subcentimeter subchorionic   hematoma. 11 mm nabothian cyst.    Gestational Sac Size (mean): 9 mm    Crown Rump Length: 6 mm    Estimated Gestational Age: 6 weeks and 3 days    Yolk Sac: Normal.    Fetal Heart Rate: Not detected.    Right ovary: 2.8 x 1.9 x 2.9 cm inclusive of a 1.5 x 1.1 x 1.5 cm corpus   luteum. Additional 1.3 x 0.8 x 0.9 cm anechoic cyst identified adjacent   to the ovary, likely representing a ovarian cyst. Ovarian blood flow is   demonstrated utilizing color and spectral Doppler.    Left ovary: 2.2 x 1.0 x 1.6 cm. Within normal limits. Ovarian blood flow   is demonstrated utilizing color and spectral Doppler.    Fluid: None.    IMPRESSION:    Single intrauterine gestation without detectable cardiac activity.   Crown-rump length of 6 mm corresponding to 6 weeks and 3 days gestation.   Small gestational sac in relation to the size of the embryo. Correlate   with prior outside imaging, if available to evaluate for interval change.   Close interval follow-up with repeat sonogram in 7-14 days is recommended   as findings are suspicious for failed first trimester pregnancy.    ANDERS ACREY M.D., ATTENDING RADIOLOGIST  This document has been electronically signed. 2018 11:40PM      < end of copied text >

## 2018-12-01 NOTE — CONSULT NOTE ADULT - ASSESSMENT
38y    Last Menstrual Period 2018  presents with vaginal bleeding, most likely 2/2 missed  considering no fetal cardiac activity on sono and closed cervix. Currently vital signs and CBC are WNL.

## 2018-12-01 NOTE — CONSULT NOTE ADULT - PROBLEM SELECTOR RECOMMENDATION 9
- currently stable, CBC and vitals WNL   - closed cervix on exam, no active bleeding   Discharged home. Will follow up in the office in 2d for downtrending of bHCG.   Gave labor precautions.     d/w Dr. Turcios   exam done with Dr. Ya

## 2018-12-02 LAB
CULTURE RESULTS: SIGNIFICANT CHANGE UP
SPECIMEN SOURCE: SIGNIFICANT CHANGE UP

## 2018-12-05 ENCOUNTER — APPOINTMENT (OUTPATIENT)
Dept: OBGYN | Facility: CLINIC | Age: 38
End: 2018-12-05
Payer: MEDICAID

## 2018-12-05 VITALS
SYSTOLIC BLOOD PRESSURE: 127 MMHG | BODY MASS INDEX: 34.66 KG/M2 | DIASTOLIC BLOOD PRESSURE: 86 MMHG | WEIGHT: 234 LBS | HEIGHT: 69 IN | HEART RATE: 84 BPM

## 2018-12-05 PROCEDURE — 99213 OFFICE O/P EST LOW 20 MIN: CPT

## 2018-12-11 ENCOUNTER — APPOINTMENT (OUTPATIENT)
Dept: MATERNAL FETAL MEDICINE | Facility: CLINIC | Age: 38
End: 2018-12-11

## 2018-12-12 ENCOUNTER — OTHER (OUTPATIENT)
Age: 38
End: 2018-12-12

## 2018-12-12 ENCOUNTER — APPOINTMENT (OUTPATIENT)
Dept: OBGYN | Facility: CLINIC | Age: 38
End: 2018-12-12
Payer: MEDICAID

## 2018-12-12 VITALS
DIASTOLIC BLOOD PRESSURE: 80 MMHG | BODY MASS INDEX: 34.52 KG/M2 | WEIGHT: 233.06 LBS | HEIGHT: 69 IN | SYSTOLIC BLOOD PRESSURE: 125 MMHG | TEMPERATURE: 98 F | HEART RATE: 84 BPM

## 2018-12-12 PROCEDURE — 99213 OFFICE O/P EST LOW 20 MIN: CPT

## 2019-04-22 ENCOUNTER — APPOINTMENT (OUTPATIENT)
Dept: OBGYN | Facility: CLINIC | Age: 39
End: 2019-04-22
Payer: MEDICAID

## 2019-04-22 VITALS
WEIGHT: 241.31 LBS | BODY MASS INDEX: 35.74 KG/M2 | DIASTOLIC BLOOD PRESSURE: 77 MMHG | SYSTOLIC BLOOD PRESSURE: 112 MMHG | HEART RATE: 94 BPM | HEIGHT: 69 IN

## 2019-04-22 DIAGNOSIS — Z00.00 ENCOUNTER FOR GENERAL ADULT MEDICAL EXAMINATION W/OUT ABNORMAL FINDINGS: ICD-10-CM

## 2019-04-22 PROCEDURE — 99214 OFFICE O/P EST MOD 30 MIN: CPT | Mod: TH

## 2019-04-22 RX ORDER — MISOPROSTOL 200 UG/1
200 TABLET ORAL
Qty: 4 | Refills: 0 | Status: COMPLETED | COMMUNITY
Start: 2018-12-05 | End: 2019-01-22

## 2019-04-22 RX ORDER — PNV/FERROUS SULFATE/FOLIC ACID 27-<0.5MG
TABLET ORAL
Refills: 0 | Status: COMPLETED | COMMUNITY
End: 2019-04-22

## 2019-04-22 NOTE — CHIEF COMPLAINT
[Follow Up] : follow up GYN visit [FreeTextEntry1] : 37 yo  LMP 3/25/2019 present for follow up from Urgent care.  Patient reports was seen at urgent care on 4/19/2019.  She was having lower back pain, lower abdominal cramping. burning on urination.  She reports was given amoxicillin.  She reports was dx with pregnancy.  BHCG  on 4/19/19 21 and repeated on 4/21/19 -100\par Patient has no complaints. She reports taking amoxicillin 875 twice daily\par Official sono done on 4/19/2019 wnl, No IUP, EUP seen.

## 2019-04-23 ENCOUNTER — APPOINTMENT (OUTPATIENT)
Dept: OBGYN | Facility: CLINIC | Age: 39
End: 2019-04-23
Payer: MEDICAID

## 2019-04-23 LAB
C TRACH RRNA SPEC QL NAA+PROBE: NOT DETECTED
N GONORRHOEA RRNA SPEC QL NAA+PROBE: NOT DETECTED
SOURCE AMPLIFICATION: NORMAL

## 2019-04-23 PROCEDURE — 36415 COLL VENOUS BLD VENIPUNCTURE: CPT

## 2019-04-25 LAB — PROGEST SERPL-MCNC: 16.7 NG/ML

## 2019-05-02 ENCOUNTER — ASOB RESULT (OUTPATIENT)
Age: 39
End: 2019-05-02

## 2019-05-02 ENCOUNTER — APPOINTMENT (OUTPATIENT)
Dept: ANTEPARTUM | Facility: CLINIC | Age: 39
End: 2019-05-02
Payer: MEDICAID

## 2019-05-02 PROCEDURE — 76817 TRANSVAGINAL US OBSTETRIC: CPT

## 2019-05-03 ENCOUNTER — RESULT REVIEW (OUTPATIENT)
Age: 39
End: 2019-05-03

## 2019-05-03 LAB — HCG SERPL-MCNC: 2897 MIU/ML

## 2019-05-06 LAB — HCG SERPL-MCNC: 2356 MIU/ML

## 2019-05-07 ENCOUNTER — RESULT REVIEW (OUTPATIENT)
Age: 39
End: 2019-05-07

## 2019-05-08 ENCOUNTER — EMERGENCY (EMERGENCY)
Facility: HOSPITAL | Age: 39
LOS: 1 days | Discharge: HOME | End: 2019-05-08
Attending: EMERGENCY MEDICINE
Payer: COMMERCIAL

## 2019-05-08 VITALS
OXYGEN SATURATION: 97 % | WEIGHT: 220.02 LBS | HEIGHT: 68 IN | HEART RATE: 93 BPM | TEMPERATURE: 98 F | RESPIRATION RATE: 18 BRPM | SYSTOLIC BLOOD PRESSURE: 133 MMHG | DIASTOLIC BLOOD PRESSURE: 78 MMHG

## 2019-05-08 DIAGNOSIS — Q36.0 CLEFT LIP, BILATERAL: Chronic | ICD-10-CM

## 2019-05-08 LAB
ALBUMIN SERPL ELPH-MCNC: 4.2 G/DL — SIGNIFICANT CHANGE UP (ref 3.3–5.2)
ALP SERPL-CCNC: 68 U/L — SIGNIFICANT CHANGE UP (ref 40–120)
ALT FLD-CCNC: 58 U/L — HIGH
ANION GAP SERPL CALC-SCNC: 14 MMOL/L — SIGNIFICANT CHANGE UP (ref 5–17)
APPEARANCE UR: CLEAR — SIGNIFICANT CHANGE UP
AST SERPL-CCNC: 52 U/L — HIGH
BASOPHILS # BLD AUTO: 0 K/UL — SIGNIFICANT CHANGE UP (ref 0–0.2)
BASOPHILS NFR BLD AUTO: 0.2 % — SIGNIFICANT CHANGE UP (ref 0–2)
BILIRUB SERPL-MCNC: 0.3 MG/DL — LOW (ref 0.4–2)
BILIRUB UR-MCNC: NEGATIVE — SIGNIFICANT CHANGE UP
BUN SERPL-MCNC: 8 MG/DL — SIGNIFICANT CHANGE UP (ref 8–20)
CALCIUM SERPL-MCNC: 9.6 MG/DL — SIGNIFICANT CHANGE UP (ref 8.6–10.2)
CHLORIDE SERPL-SCNC: 102 MMOL/L — SIGNIFICANT CHANGE UP (ref 98–107)
CO2 SERPL-SCNC: 23 MMOL/L — SIGNIFICANT CHANGE UP (ref 22–29)
COLOR SPEC: YELLOW — SIGNIFICANT CHANGE UP
CREAT SERPL-MCNC: 0.88 MG/DL — SIGNIFICANT CHANGE UP (ref 0.5–1.3)
DIFF PNL FLD: ABNORMAL
EOSINOPHIL # BLD AUTO: 0.3 K/UL — SIGNIFICANT CHANGE UP (ref 0–0.5)
EOSINOPHIL NFR BLD AUTO: 2.4 % — SIGNIFICANT CHANGE UP (ref 0–6)
EPI CELLS # UR: SIGNIFICANT CHANGE UP
GLUCOSE SERPL-MCNC: 120 MG/DL — HIGH (ref 70–115)
GLUCOSE UR QL: NEGATIVE MG/DL — SIGNIFICANT CHANGE UP
HCG SERPL-ACNC: 1744 MIU/ML — HIGH
HCT VFR BLD CALC: 38.7 % — SIGNIFICANT CHANGE UP (ref 37–47)
HGB BLD-MCNC: 12.8 G/DL — SIGNIFICANT CHANGE UP (ref 12–16)
KETONES UR-MCNC: NEGATIVE — SIGNIFICANT CHANGE UP
LEUKOCYTE ESTERASE UR-ACNC: ABNORMAL
LYMPHOCYTES # BLD AUTO: 3.7 K/UL — SIGNIFICANT CHANGE UP (ref 1–4.8)
LYMPHOCYTES # BLD AUTO: 34.1 % — SIGNIFICANT CHANGE UP (ref 20–55)
MCHC RBC-ENTMCNC: 30.3 PG — SIGNIFICANT CHANGE UP (ref 27–31)
MCHC RBC-ENTMCNC: 33.1 G/DL — SIGNIFICANT CHANGE UP (ref 32–36)
MCV RBC AUTO: 91.7 FL — SIGNIFICANT CHANGE UP (ref 81–99)
MONOCYTES # BLD AUTO: 0.7 K/UL — SIGNIFICANT CHANGE UP (ref 0–0.8)
MONOCYTES NFR BLD AUTO: 6.8 % — SIGNIFICANT CHANGE UP (ref 3–10)
NEUTROPHILS # BLD AUTO: 6 K/UL — SIGNIFICANT CHANGE UP (ref 1.8–8)
NEUTROPHILS NFR BLD AUTO: 56.1 % — SIGNIFICANT CHANGE UP (ref 37–73)
NITRITE UR-MCNC: NEGATIVE — SIGNIFICANT CHANGE UP
PH UR: 5 — SIGNIFICANT CHANGE UP (ref 5–8)
PLATELET # BLD AUTO: 259 K/UL — SIGNIFICANT CHANGE UP (ref 150–400)
POTASSIUM SERPL-MCNC: 4 MMOL/L — SIGNIFICANT CHANGE UP (ref 3.5–5.3)
POTASSIUM SERPL-SCNC: 4 MMOL/L — SIGNIFICANT CHANGE UP (ref 3.5–5.3)
PROT SERPL-MCNC: 7.5 G/DL — SIGNIFICANT CHANGE UP (ref 6.6–8.7)
PROT UR-MCNC: NEGATIVE MG/DL — SIGNIFICANT CHANGE UP
RBC # BLD: 4.22 M/UL — LOW (ref 4.4–5.2)
RBC # FLD: 14.3 % — SIGNIFICANT CHANGE UP (ref 11–15.6)
RBC CASTS # UR COMP ASSIST: SIGNIFICANT CHANGE UP /HPF (ref 0–4)
SODIUM SERPL-SCNC: 139 MMOL/L — SIGNIFICANT CHANGE UP (ref 135–145)
SP GR SPEC: 1.01 — SIGNIFICANT CHANGE UP (ref 1.01–1.02)
UROBILINOGEN FLD QL: NEGATIVE MG/DL — SIGNIFICANT CHANGE UP
WBC # BLD: 10.8 K/UL — SIGNIFICANT CHANGE UP (ref 4.8–10.8)
WBC # FLD AUTO: 10.8 K/UL — SIGNIFICANT CHANGE UP (ref 4.8–10.8)
WBC UR QL: SIGNIFICANT CHANGE UP

## 2019-05-08 PROCEDURE — 85027 COMPLETE CBC AUTOMATED: CPT

## 2019-05-08 PROCEDURE — 99284 EMERGENCY DEPT VISIT MOD MDM: CPT

## 2019-05-08 PROCEDURE — 76801 OB US < 14 WKS SINGLE FETUS: CPT

## 2019-05-08 PROCEDURE — 76801 OB US < 14 WKS SINGLE FETUS: CPT | Mod: 26

## 2019-05-08 PROCEDURE — 36415 COLL VENOUS BLD VENIPUNCTURE: CPT

## 2019-05-08 PROCEDURE — 76817 TRANSVAGINAL US OBSTETRIC: CPT | Mod: 26

## 2019-05-08 PROCEDURE — 84702 CHORIONIC GONADOTROPIN TEST: CPT

## 2019-05-08 PROCEDURE — 81001 URINALYSIS AUTO W/SCOPE: CPT

## 2019-05-08 PROCEDURE — 76817 TRANSVAGINAL US OBSTETRIC: CPT

## 2019-05-08 PROCEDURE — 80053 COMPREHEN METABOLIC PANEL: CPT

## 2019-05-08 RX ORDER — SODIUM CHLORIDE 9 MG/ML
3 INJECTION INTRAMUSCULAR; INTRAVENOUS; SUBCUTANEOUS ONCE
Qty: 0 | Refills: 0 | Status: COMPLETED | OUTPATIENT
Start: 2019-05-08 | End: 2019-05-08

## 2019-05-08 RX ORDER — NITROFURANTOIN MACROCRYSTAL 50 MG
1 CAPSULE ORAL
Qty: 14 | Refills: 0
Start: 2019-05-08 | End: 2019-05-14

## 2019-05-08 RX ADMIN — SODIUM CHLORIDE 3 MILLILITER(S): 9 INJECTION INTRAMUSCULAR; INTRAVENOUS; SUBCUTANEOUS at 18:53

## 2019-05-08 NOTE — ED STATDOCS - GASTROINTESTINAL, MLM
abdomen soft, non-distended, minimal suprapubic tenderness, no guarding, no rebound. Bowel sounds present.

## 2019-05-08 NOTE — ED ADULT NURSE NOTE - CAS EDN DISCHARGE ASSESSMENT
Addended by: DAMION ELY on: 7/19/2018 09:46 AM     Modules accepted: Orders    
Alert and oriented to person, place and time

## 2019-05-08 NOTE — ED STATDOCS - CLINICAL SUMMARY MEDICAL DECISION MAKING FREE TEXT BOX
pt  A2, 4 weeks pregnant, presents with lower abdominal pain x1 day, no bleeding, scant yellow discharge, will check urine, labs, if hormone level high enough will order pelvic US and reassess.

## 2019-05-08 NOTE — ED ADULT NURSE NOTE - NSIMPLEMENTINTERV_GEN_ALL_ED
Implemented All Universal Safety Interventions:  [place] to call system. Call bell, personal items and telephone within reach. Instruct patient to call for assistance. Room bathroom lighting operational. Non-slip footwear when patient is off stretcher. Physically safe environment: no spills, clutter or unnecessary equipment. Stretcher in lowest position, wheels locked, appropriate side rails in place.

## 2019-05-08 NOTE — ED STATDOCS - OBJECTIVE STATEMENT
38 y/o F pt 4 weeks pregnant with PMHx  A2 presents to the ED c/o abdominal pain beginning today.  Pt reports suprapubic cramping and yellowish vaginal discharge beginning today.  Pt is eating and sleeping well.  LMP march.  Denies vaginal bleeding, fever, chills, N/V/D, dysuria.  No further acute complaints at this time.  GYN: [facility]

## 2019-05-08 NOTE — ED ADULT NURSE NOTE - OBJECTIVE STATEMENT
pt AOX4 in no apparent distress c/o lower abdominal pain that started today, pt denies any N/V diarrhea, blood in urine, vaginal bleeding, lightheaded, fever chills. pt is 4 weeks pregnant

## 2019-05-08 NOTE — ED STATDOCS - CARE PLAN
Principal Discharge DX:	UTI (urinary tract infection) Principal Discharge DX:	Abdominal pain during pregnancy in first trimester  Secondary Diagnosis:	Acute cystitis without hematuria

## 2019-05-08 NOTE — ED STATDOCS - ATTENDING CONTRIBUTION TO CARE
I, Abbie Lemus, performed the initial face to face bedside interview with this patient regarding history of present illness, review of symptoms and relevant past medical, social and family history.  I completed an independent physical examination.  I was the initial provider who evaluated this patient. I have signed out the follow up of any pending tests (i.e. labs, radiological studies) to the ACP.  I have communicated the patient’s plan of care and disposition with the ACP.  The history, relevant review of systems, past medical and surgical history, medical decision making, and physical examination was documented by the scribe in my presence and I attest to the accuracy of the documentation.

## 2019-05-08 NOTE — ED ADULT TRIAGE NOTE - CHIEF COMPLAINT QUOTE
Patient states that she is 4 weeks pregnant and is having lower abdominal pain. Denies any vaginal bleeding

## 2019-05-09 VITALS
HEART RATE: 79 BPM | RESPIRATION RATE: 16 BRPM | OXYGEN SATURATION: 99 % | SYSTOLIC BLOOD PRESSURE: 116 MMHG | TEMPERATURE: 98 F | DIASTOLIC BLOOD PRESSURE: 68 MMHG

## 2019-05-13 ENCOUNTER — APPOINTMENT (OUTPATIENT)
Dept: ANTEPARTUM | Facility: CLINIC | Age: 39
End: 2019-05-13
Payer: MEDICAID

## 2019-05-13 ENCOUNTER — APPOINTMENT (OUTPATIENT)
Dept: OBGYN | Facility: CLINIC | Age: 39
End: 2019-05-13
Payer: MEDICAID

## 2019-05-13 ENCOUNTER — ASOB RESULT (OUTPATIENT)
Age: 39
End: 2019-05-13

## 2019-05-13 VITALS
WEIGHT: 242.25 LBS | HEIGHT: 69 IN | DIASTOLIC BLOOD PRESSURE: 79 MMHG | HEART RATE: 86 BPM | BODY MASS INDEX: 35.88 KG/M2 | SYSTOLIC BLOOD PRESSURE: 115 MMHG

## 2019-05-13 PROCEDURE — 76817 TRANSVAGINAL US OBSTETRIC: CPT

## 2019-05-13 PROCEDURE — 99213 OFFICE O/P EST LOW 20 MIN: CPT

## 2019-05-14 ENCOUNTER — RESULT REVIEW (OUTPATIENT)
Age: 39
End: 2019-05-14

## 2019-05-14 LAB — HCG SERPL-MCNC: 1911 MIU/ML

## 2019-05-14 NOTE — CHIEF COMPLAINT
[FreeTextEntry1] : 37 yo  LMP 3/25/2019 presents for follow up and for sono for viability. She has no complaints. She denies abdominal pain, no vaginal bleeding. This is a desired pregnancy.

## 2019-05-15 ENCOUNTER — APPOINTMENT (OUTPATIENT)
Dept: OBGYN | Facility: CLINIC | Age: 39
End: 2019-05-15
Payer: MEDICAID

## 2019-05-15 VITALS
BODY MASS INDEX: 35.55 KG/M2 | SYSTOLIC BLOOD PRESSURE: 114 MMHG | DIASTOLIC BLOOD PRESSURE: 78 MMHG | WEIGHT: 240 LBS | HEART RATE: 92 BPM | HEIGHT: 69 IN

## 2019-05-15 PROCEDURE — 36415 COLL VENOUS BLD VENIPUNCTURE: CPT

## 2019-05-15 PROCEDURE — 99213 OFFICE O/P EST LOW 20 MIN: CPT | Mod: 25

## 2019-05-15 PROCEDURE — 76830 TRANSVAGINAL US NON-OB: CPT

## 2019-05-17 LAB — HCG SERPL-MCNC: 753 MIU/ML

## 2019-05-17 NOTE — CHIEF COMPLAINT
[Follow Up] : follow up GYN visit [FreeTextEntry1] : 40 yo P 2002 LMP 3/25/2019 with early pregnancy suspected abnormal intrauterine pregnancy presents with complaint of heavy bleeding with passage of clot this morning with abdominal cramping. She has no complaints now. States has vaginal spotting. \par

## 2019-05-22 ENCOUNTER — APPOINTMENT (OUTPATIENT)
Dept: OBGYN | Facility: CLINIC | Age: 39
End: 2019-05-22

## 2020-01-01 NOTE — ED PROVIDER NOTE - NS ED NOTE AC HIGH RISK COUNTRIES
SUBJECTIVE:  Kavya Frederick is a 4 month old female who presents for 4 month old well child exam.  Patient presents with Mother.    Concerns raised today include: none    Feeding: bottle fed milk and similac sensitive formula and taking 4 ounces every 2-4 hours  Sleeping: no concerns  Elimination:  normal voiding and stooling frequency    Birth history, medical history, surgical history, social and family history and development reviewed and updated.    ROS:  Negative except for above mentioned concerns.    PHYSICAL EXAM:  Visit Vitals  Temp 98.6 °F (37 °C) (Temporal)   Ht 24.5\" (62.2 cm)   Wt 5.347 kg (11 lb 12.6 oz)   HC 41.4 cm (16.3\")   BMI 13.81 kg/m²     GENERAL: healthy appearing and alert, in no acute distress  HEAD & SCALP: normocephalic with no lesions       [place]: anterior fontanelle open, soft, flat  EYES: + red reflex b/l, no redness, no drainage, EOMI, YANG  EARS: normal appearance to external ears, canals, TMs visualized b/l   NOSE: Patent, no drainage, no swelling  MOUTH: moist mucus membranes, no lesions  THROAT: no erythema, no lesions  NECK: supple, no adenopathy, no masses  CHEST:  Clear to ausculation b/l no wheezes, no retractions  CARDIO: regular rate and rhythm, no murmur  ABDOMEN: +BS, soft, nontender, nondistended, no masses, no hepatosplenomegaly, reducible umbilical hernia - improving  : Jeffrey stage 1  female  MUS-SKEL: moves all 4 extremities equally, no scoliosis     Hip Exam: normal range of motion, no clicks  SKIN: no rashes or new lesions  NEURO: Normal muscle tone and strength    ASSESSMENT:  4 month old female well child.  Umbilical hernia - improving    PLAN:   All parental concerns and questions discussed.  Anticipatory guidance provided, handout given.              Development              Sudden Infant Death Syndrome prevention              Accident prevention: falls, small toys, smothering              No bottle in bed              Analgesics/antipyretics               Sun exposure    Vitamin D supplementation: recommended    Immunizations given today: Pediarix (DTaP, Hepatitis B, IPV), Hib, Prevnar, Rotateq, Risks, benefits, and side effects of immunizations discussed. and Current VIS given to parent/guardian at visit and verbal parent/guardian consent obtained     Return to office for 6 month well child examination or sooner prn illness/concerns.    Esthela Albarado MD               No

## 2020-02-18 ENCOUNTER — EMERGENCY (EMERGENCY)
Facility: HOSPITAL | Age: 40
LOS: 1 days | Discharge: HOME | End: 2020-02-18
Attending: EMERGENCY MEDICINE
Payer: COMMERCIAL

## 2020-02-18 VITALS
HEART RATE: 81 BPM | SYSTOLIC BLOOD PRESSURE: 112 MMHG | OXYGEN SATURATION: 98 % | WEIGHT: 229.94 LBS | DIASTOLIC BLOOD PRESSURE: 75 MMHG | TEMPERATURE: 98 F | RESPIRATION RATE: 20 BRPM | HEIGHT: 69 IN

## 2020-02-18 DIAGNOSIS — Q36.0 CLEFT LIP, BILATERAL: Chronic | ICD-10-CM

## 2020-02-18 PROCEDURE — 99283 EMERGENCY DEPT VISIT LOW MDM: CPT | Mod: 25

## 2020-02-18 PROCEDURE — T1013: CPT

## 2020-02-18 PROCEDURE — 99284 EMERGENCY DEPT VISIT MOD MDM: CPT

## 2020-02-18 PROCEDURE — 96372 THER/PROPH/DIAG INJ SC/IM: CPT

## 2020-02-18 RX ORDER — IBUPROFEN 200 MG
1 TABLET ORAL
Qty: 15 | Refills: 0
Start: 2020-02-18 | End: 2020-02-22

## 2020-02-18 RX ORDER — KETOROLAC TROMETHAMINE 30 MG/ML
60 SYRINGE (ML) INJECTION ONCE
Refills: 0 | Status: DISCONTINUED | OUTPATIENT
Start: 2020-02-18 | End: 2020-02-18

## 2020-02-18 RX ADMIN — Medication 60 MILLIGRAM(S): at 13:53

## 2020-02-18 RX ADMIN — Medication 60 MILLIGRAM(S): at 13:54

## 2020-02-18 NOTE — ED ADULT TRIAGE NOTE - CHIEF COMPLAINT QUOTE
pt c/o she woke up yesterday with  pain to right shoulder that radiated down to her fingers,  pt able to move extremity with difficulty d/t pain. pt denies injury. pt has not taken any medication for discomforts.

## 2020-02-18 NOTE — ED STATDOCS - OBJECTIVE STATEMENT
41 y/o F pt with no significant PMHx presents to the ED c/o right shoulder pain onset yesterday. The right shoulder pain radiates down her right arm and into her fingers. It's described as a heavy sensation. Aggravating factor of lifting her right arm, and feels like something is "cracking inside". She reports that she woke up yesterday with the symptoms. Patient took Tylenol yesterday with temporary relief, before her symptoms returned. Patient has never had a past occurrence of these symptoms. She is left hand dominant. NKDA. Denies fever, cough, HA and any trauma to the area. No further acute complaints at this time.   : Rajan

## 2020-02-18 NOTE — ED STATDOCS - PATIENT PORTAL LINK FT
You can access the FollowMyHealth Patient Portal offered by [facility] by registering at the following website: [email or web address]. By joining [employer]’s FollowMyHealth portal, you will also be able to view your health information using other applications (apps) compatible with our system.

## 2020-02-18 NOTE — ED STATDOCS - NS ED ROS FT
Const: Denies fever, chills  HEENT: Denies blurry vision, sore throat  Neck: Denies neck pain/stiffness  Resp: Denies coughing, SOB  Cardiovascular: Denies CP, palpitations, LE edema  GI: Denies nausea, vomiting, abdominal pain, diarrhea, constipation, blood in stool  : Denies urinary frequency/urgency/dysuria, hematuria  MSK: (+) right shoulder pain. (+) RUE pain. Denies back pain  Neuro: Denies HA, dizziness, numbness, weakness  Skin: Denies rashes.

## 2020-02-18 NOTE — ED STATDOCS - CARE PROVIDER_API CALL
Freddie Tenorio (DO)  Orthopaedic Surgery  [address], [address]  [place] [address]  Phone: [phone]  Fax: [phone]  Follow Up Time: 7-10 Days

## 2020-02-18 NOTE — ED STATDOCS - CLINICAL SUMMARY MEDICAL DECISION MAKING FREE TEXT BOX
39 y/o F with atraumatic right shoulder pain radiating down to fingers. worse with movement. FROM, but pain at extreme ROM passively and actively. Sensation intact. Will give Toradol, prednisone for pain. ,sent prescription to pharmacy, recommend ortho f/u. No imaging indicated.

## 2020-02-18 NOTE — ED STATDOCS - PHYSICAL EXAMINATION
Const: Awake, alert and oriented. In no acute distress. Well appearing.  HEENT: NC/AT. Moist mucous membranes.  Eyes: No scleral icterus. EOMI.  Neck:. Soft and supple. Full ROM without pain.  Cardiac: Regular rate and regular rhythm. +S1/S2. No murmurs. Peripheral pulses 2+ and symmetric. No LE edema.  Resp: Speaking in full sentences. No evidence of respiratory distress. No wheezes, rales or rhonchi.  Abd: Soft, non-tender, non-distended. Normal bowel sounds in all 4 quadrants. No guarding or rebound.  Back: Spine midline and non-tender. No CVAT.  Musk:  RUE: (+) pain with passive external rotation.  (+) Pain with flexion greater than 90 degrees. (+) Pain with abduction greater than 90 degrees. (+) tenderness to palpation over right bicep head and tenderness over deltoid. (-) No pain with passive internal rotation.  (-) No tenderness over clavicle.   Skin: No rashes, abrasions or lacerations.  Neuro: Awake, alert & oriented x 3. Moves all extremities symmetrically.

## 2020-02-19 ENCOUNTER — APPOINTMENT (OUTPATIENT)
Dept: OBGYN | Facility: CLINIC | Age: 40
End: 2020-02-19
Payer: MEDICAID

## 2020-02-19 VITALS
DIASTOLIC BLOOD PRESSURE: 81 MMHG | BODY MASS INDEX: 35.6 KG/M2 | WEIGHT: 241.06 LBS | HEART RATE: 82 BPM | SYSTOLIC BLOOD PRESSURE: 118 MMHG

## 2020-02-19 PROCEDURE — 99214 OFFICE O/P EST MOD 30 MIN: CPT

## 2020-02-19 NOTE — PHYSICAL EXAM
[Awake] : awake [Acute Distress] : no acute distress [Mass] : no breast mass [Alert] : alert [Axillary LAD] : no axillary lymphadenopathy [Nipple Discharge] : no nipple discharge [Oriented x3] : oriented to person, place, and time [Tender] : non tender [Soft] : soft [No Bleeding] : there was no active vaginal bleeding [Normal] : cervix [Uterine Adnexae] : were not tender and not enlarged

## 2020-02-19 NOTE — COUNSELING
[Exercise] : exercise [Vitamins/Supplements] : vitamins/supplements [Breast Self Exam] : breast self exam [Nutrition] : nutrition [STD (testing, results, tx)] : STD (testing, results, tx) [Safe Sexual Practices] : safe sexual practices

## 2020-02-20 LAB
C TRACH RRNA SPEC QL NAA+PROBE: NOT DETECTED
HPV HIGH+LOW RISK DNA PNL CVX: NOT DETECTED
N GONORRHOEA RRNA SPEC QL NAA+PROBE: NOT DETECTED
SOURCE TP AMPLIFICATION: NORMAL

## 2020-02-21 ENCOUNTER — FORM ENCOUNTER (OUTPATIENT)
Age: 40
End: 2020-02-21

## 2020-02-22 ENCOUNTER — APPOINTMENT (OUTPATIENT)
Dept: ULTRASOUND IMAGING | Facility: CLINIC | Age: 40
End: 2020-02-22
Payer: MEDICAID

## 2020-02-22 ENCOUNTER — APPOINTMENT (OUTPATIENT)
Dept: MAMMOGRAPHY | Facility: CLINIC | Age: 40
End: 2020-02-22
Payer: MEDICAID

## 2020-02-22 ENCOUNTER — OUTPATIENT (OUTPATIENT)
Dept: OUTPATIENT SERVICES | Facility: HOSPITAL | Age: 40
LOS: 1 days | End: 2020-02-22
Payer: MEDICAID

## 2020-02-22 DIAGNOSIS — Z00.8 ENCOUNTER FOR OTHER GENERAL EXAMINATION: ICD-10-CM

## 2020-02-22 DIAGNOSIS — N97.9 FEMALE INFERTILITY, UNSPECIFIED: ICD-10-CM

## 2020-02-22 DIAGNOSIS — Q36.0 CLEFT LIP, BILATERAL: Chronic | ICD-10-CM

## 2020-02-22 PROCEDURE — 77063 BREAST TOMOSYNTHESIS BI: CPT | Mod: 26

## 2020-02-22 PROCEDURE — 77063 BREAST TOMOSYNTHESIS BI: CPT

## 2020-02-22 PROCEDURE — 77067 SCR MAMMO BI INCL CAD: CPT | Mod: 26

## 2020-02-22 PROCEDURE — 77067 SCR MAMMO BI INCL CAD: CPT

## 2020-02-24 LAB — CYTOLOGY CVX/VAG DOC THIN PREP: ABNORMAL

## 2020-04-19 ENCOUNTER — TRANSCRIPTION ENCOUNTER (OUTPATIENT)
Age: 40
End: 2020-04-19

## 2020-05-14 ENCOUNTER — APPOINTMENT (OUTPATIENT)
Dept: OBGYN | Facility: CLINIC | Age: 40
End: 2020-05-14
Payer: MEDICAID

## 2020-05-14 VITALS
SYSTOLIC BLOOD PRESSURE: 134 MMHG | DIASTOLIC BLOOD PRESSURE: 88 MMHG | HEIGHT: 69 IN | WEIGHT: 243.56 LBS | HEART RATE: 84 BPM | BODY MASS INDEX: 36.08 KG/M2

## 2020-05-14 PROCEDURE — 99213 OFFICE O/P EST LOW 20 MIN: CPT

## 2020-06-05 ENCOUNTER — APPOINTMENT (OUTPATIENT)
Dept: OBGYN | Facility: CLINIC | Age: 40
End: 2020-06-05
Payer: MEDICAID

## 2020-06-05 VITALS
HEART RATE: 87 BPM | SYSTOLIC BLOOD PRESSURE: 129 MMHG | DIASTOLIC BLOOD PRESSURE: 87 MMHG | WEIGHT: 243 LBS | BODY MASS INDEX: 35.99 KG/M2 | HEIGHT: 69 IN

## 2020-06-05 PROCEDURE — 58558Z: CUSTOM

## 2020-06-05 NOTE — PROCEDURE
[Endometrial Biopsy] : Endometrial biopsy [Risks] : risks [Benefits] : benefits [Alternatives] : alternatives [Patient] : patient [Infection] : infection [Bleeding] : bleeding [Uterine Perforation] : uterine perforation [Allergic Reaction] : allergic reaction [CONSENT OBTAINED] : written consent was obtained prior to the procedure. [Pain] : pain [Neg Pregnancy Test] : a pregnancy test was negative [LMP ___] : LMP was [unfilled] [1%] : 1% [Betadine] : Betadine [Paracervical Block] : A paracervical block was performed using [Without Epi] : without epinephrine [Tenaculum] : a single toothed tenaculum [Required Dilation] : required dilation [Sounded to ___ cm] : sounded to [unfilled] ~Ucm [Mid Position] : mid position [Pipelle] : a Pipelle endometrial suction curette [Tolerated Well] : the patient tolerated the procedure well [Sent to Histology] : the specimen was place in buffered formalin and sent for pathlogy [No Complications] : there were no complications

## 2020-06-08 LAB — CORE LAB BIOPSY: NORMAL

## 2020-07-06 ENCOUNTER — APPOINTMENT (OUTPATIENT)
Dept: OBGYN | Facility: CLINIC | Age: 40
End: 2020-07-06
Payer: MEDICAID

## 2020-07-06 VITALS
BODY MASS INDEX: 36.34 KG/M2 | HEART RATE: 73 BPM | DIASTOLIC BLOOD PRESSURE: 80 MMHG | WEIGHT: 245.38 LBS | HEIGHT: 69 IN | SYSTOLIC BLOOD PRESSURE: 119 MMHG

## 2020-07-06 PROCEDURE — 99213 OFFICE O/P EST LOW 20 MIN: CPT

## 2020-08-03 ENCOUNTER — APPOINTMENT (OUTPATIENT)
Dept: OBGYN | Facility: CLINIC | Age: 40
End: 2020-08-03
Payer: MEDICAID

## 2020-08-07 ENCOUNTER — APPOINTMENT (OUTPATIENT)
Dept: OBGYN | Facility: CLINIC | Age: 40
End: 2020-08-07
Payer: MEDICAID

## 2020-08-07 VITALS
DIASTOLIC BLOOD PRESSURE: 73 MMHG | WEIGHT: 242.38 LBS | SYSTOLIC BLOOD PRESSURE: 112 MMHG | BODY MASS INDEX: 35.79 KG/M2

## 2020-08-07 PROCEDURE — 99213 OFFICE O/P EST LOW 20 MIN: CPT

## 2020-09-04 ENCOUNTER — APPOINTMENT (OUTPATIENT)
Dept: OBGYN | Facility: CLINIC | Age: 40
End: 2020-09-04
Payer: MEDICAID

## 2020-09-04 VITALS — DIASTOLIC BLOOD PRESSURE: 74 MMHG | BODY MASS INDEX: 35.9 KG/M2 | WEIGHT: 243.13 LBS | SYSTOLIC BLOOD PRESSURE: 105 MMHG

## 2020-09-04 LAB
HCG UR QL: NEGATIVE
QUALITY CONTROL: YES

## 2020-09-04 PROCEDURE — 81025 URINE PREGNANCY TEST: CPT

## 2020-09-04 PROCEDURE — 99213 OFFICE O/P EST LOW 20 MIN: CPT

## 2020-10-02 ENCOUNTER — APPOINTMENT (OUTPATIENT)
Dept: OBGYN | Facility: CLINIC | Age: 40
End: 2020-10-02
Payer: MEDICAID

## 2020-10-02 VITALS
SYSTOLIC BLOOD PRESSURE: 119 MMHG | DIASTOLIC BLOOD PRESSURE: 76 MMHG | WEIGHT: 242.56 LBS | BODY MASS INDEX: 35.82 KG/M2

## 2020-10-02 PROCEDURE — 99213 OFFICE O/P EST LOW 20 MIN: CPT

## 2020-10-06 DIAGNOSIS — Z32.00 ENCOUNTER FOR PREGNANCY TEST, RESULT UNKNOWN: ICD-10-CM

## 2020-10-06 DIAGNOSIS — O03.9 COMPLETE OR UNSPECIFIED SPONTANEOUS ABORTION W/OUT COMPLICATION: ICD-10-CM

## 2020-10-06 DIAGNOSIS — N92.6 IRREGULAR MENSTRUATION, UNSPECIFIED: ICD-10-CM

## 2020-10-06 DIAGNOSIS — Z34.90 ENCOUNTER FOR SUPERVISION OF NORMAL PREGNANCY, UNSPECIFIED, UNSPECIFIED TRIMESTER: ICD-10-CM

## 2020-10-06 DIAGNOSIS — Z87.59 PERSONAL HISTORY OF OTHER COMPLICATIONS OF PREGNANCY, CHILDBIRTH AND THE PUERPERIUM: ICD-10-CM

## 2020-10-06 RX ORDER — MISOPROSTOL 200 UG/1
200 TABLET ORAL
Qty: 4 | Refills: 0 | Status: COMPLETED | COMMUNITY
Start: 2019-05-15 | End: 2020-10-06

## 2020-10-06 RX ORDER — PRENATAL VIT NO.130/IRON/FOLIC 27MG-0.8MG
28-0.8 TABLET ORAL DAILY
Qty: 90 | Refills: 2 | Status: COMPLETED | COMMUNITY
Start: 2020-05-14 | End: 2020-10-06

## 2020-10-06 RX ORDER — VITAMIN C, CALCIUM, IRON, VITAMIN D3, VITAMIN E, THIAMIN, RIBOFLAVIN, NIACINAMIDE, VITAMIN B6, FOLIC ACID, IODINE, ZINC, COPPER, DOCUSATE SODIUM
27-1 & 250 KIT
Qty: 30 | Refills: 5 | Status: COMPLETED | COMMUNITY
Start: 2019-04-22 | End: 2020-10-06

## 2020-10-06 RX ORDER — PRENATAL VIT NO.130/IRON/FOLIC 27MG-0.8MG
28-0.8 TABLET ORAL DAILY
Qty: 90 | Refills: 2 | Status: COMPLETED | COMMUNITY
Start: 2020-06-05 | End: 2020-10-06

## 2020-10-14 RX ORDER — CLOMIPHENE CITRATE 50 MG/1
50 TABLET ORAL DAILY
Qty: 14 | Refills: 6 | Status: COMPLETED | COMMUNITY
Start: 2020-07-06 | End: 2020-10-14

## 2020-10-30 ENCOUNTER — APPOINTMENT (OUTPATIENT)
Dept: OBGYN | Facility: CLINIC | Age: 40
End: 2020-10-30

## 2020-11-13 ENCOUNTER — APPOINTMENT (OUTPATIENT)
Dept: PULMONOLOGY | Facility: CLINIC | Age: 40
End: 2020-11-13
Payer: MEDICAID

## 2020-11-13 VITALS
HEART RATE: 82 BPM | OXYGEN SATURATION: 98 % | WEIGHT: 238 LBS | BODY MASS INDEX: 35.25 KG/M2 | DIASTOLIC BLOOD PRESSURE: 74 MMHG | SYSTOLIC BLOOD PRESSURE: 108 MMHG | RESPIRATION RATE: 16 BRPM | HEIGHT: 69 IN

## 2020-11-13 VITALS — RESPIRATION RATE: 16 BRPM

## 2020-11-13 PROCEDURE — 99072 ADDL SUPL MATRL&STAF TM PHE: CPT

## 2020-11-13 PROCEDURE — 99205 OFFICE O/P NEW HI 60 MIN: CPT

## 2020-11-13 RX ORDER — MEDROXYPROGESTERONE ACETATE 10 MG/1
10 TABLET ORAL DAILY
Qty: 12 | Refills: 5 | Status: DISCONTINUED | COMMUNITY
Start: 2020-09-04 | End: 2020-11-13

## 2020-11-13 RX ORDER — CLOMIPHENE CITRATE 50 MG/1
50 TABLET ORAL
Qty: 14 | Refills: 3 | Status: DISCONTINUED | COMMUNITY
Start: 2020-10-02 | End: 2020-11-13

## 2020-11-13 NOTE — REASON FOR VISIT
[Consultation] : a consultation [Abnormal CXR/ Chest CT] : an abnormal CXR/ chest CT [Chest Pain] : chest pain [Pre-op Risk Stratification] : pre-op risk stratification

## 2020-11-13 NOTE — ASSESSMENT
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 This could be related to hormonal therapy. No evidence for pulmonary infarct causing the pain. The pain is likely costochondritis. A plasma d-dimer level will be obtained to see if there is any evidence for active clotting. If so we will need to investigate further.\par \par The patient is also preoperative for bariatric surgery. We will obtain pulmonary function studies and a home sleep study in preparation for that.

## 2020-11-13 NOTE — CONSULT LETTER
[Dear  ___] : Dear  [unfilled], [Consult Letter:] : I had the pleasure of evaluating your patient, [unfilled]. [Please see my note below.] : Please see my note below. [Consult Closing:] : Thank you very much for allowing me to participate in the care of this patient.  If you have any questions, please do not hesitate to contact me. [Sincerely,] : Sincerely, [FreeTextEntry3] : Oumou Summers MD FCCP\par D-ABSM\par ABIM board certified in  Pulmonary diseases, Sleep medicine\par Internal medicine\par

## 2020-11-13 NOTE — PHYSICAL EXAM
[No Acute Distress] : no acute distress [Low Lying Soft Palate] : low lying soft palate [Enlarged Base of the Tongue] : enlarged base of the tongue [III] : Mallampati Class: III [2+] : Right Tonsil: 2+ [Normal Appearance] : normal appearance [No Neck Mass] : no neck mass [Normal Rate/Rhythm] : normal rate/rhythm [Normal S1, S2] : normal s1, s2 [No Murmurs] : no murmurs [No Resp Distress] : no resp distress [Clear to Auscultation Bilaterally] : clear to auscultation bilaterally [No Abnormalities] : no abnormalities [Tenderness: ___] : tenderness: [unfilled] [Benign] : benign [Normal Gait] : normal gait [No Clubbing] : no clubbing [No Cyanosis] : no cyanosis [No Edema] : no edema [FROM] : FROM [Normal Color/ Pigmentation] : normal color/ pigmentation [No Focal Deficits] : no focal deficits [Oriented x3] : oriented x3 [Normal Affect] : normal affect [TextBox_2] : obese

## 2020-11-13 NOTE — HISTORY OF PRESENT ILLNESS
[TextBox_4] : The patient is a 40-year-old  female who comes for evaluation of an abnormal chest CT scan. She had been complaining for several weeks of some anterior chest discomfort which she describes as nonpleuritic. Sometimes she feels it on the left and sometimes on the right. She was seen in clinic and was sent for a CT angiogram on October 16. It demonstrated a linear filling defect in the left upper lobe pulmonary artery. She was sent here for further evaluation. Patient had been on monotherapy for fertility reasons for several months. She denies leg pain or swelling or abdominal pain. There is no cough or hemoptysis. She is a nonsmoker. She denies shortness of breath.\par \par The patient is also preoperative for bariatric surgery and needs preoperative testing for that.She does snore but denies excessive sleepiness other than the fact that some nights she can only get for 5 hours of sleep because of work and family issues.

## 2020-11-17 LAB — DEPRECATED D DIMER PPP IA-ACNC: 242 NG/ML DDU

## 2021-12-17 ENCOUNTER — APPOINTMENT (OUTPATIENT)
Dept: OBGYN | Facility: CLINIC | Age: 41
End: 2021-12-17

## 2022-01-05 ENCOUNTER — APPOINTMENT (OUTPATIENT)
Dept: OBGYN | Facility: CLINIC | Age: 42
End: 2022-01-05

## 2022-01-28 ENCOUNTER — EMERGENCY (EMERGENCY)
Facility: HOSPITAL | Age: 42
LOS: 1 days | Discharge: HOME | End: 2022-01-28
Attending: EMERGENCY MEDICINE
Payer: COMMERCIAL

## 2022-01-28 VITALS
OXYGEN SATURATION: 98 % | RESPIRATION RATE: 18 BRPM | HEIGHT: 69 IN | HEART RATE: 70 BPM | TEMPERATURE: 98 F | SYSTOLIC BLOOD PRESSURE: 118 MMHG | WEIGHT: 220.02 LBS | DIASTOLIC BLOOD PRESSURE: 80 MMHG

## 2022-01-28 DIAGNOSIS — Q36.0 CLEFT LIP, BILATERAL: Chronic | ICD-10-CM

## 2022-01-28 PROCEDURE — 73130 X-RAY EXAM OF HAND: CPT | Mod: 26,LT

## 2022-01-28 PROCEDURE — 99283 EMERGENCY DEPT VISIT LOW MDM: CPT

## 2022-01-28 PROCEDURE — 73130 X-RAY EXAM OF HAND: CPT

## 2022-01-28 PROCEDURE — 73110 X-RAY EXAM OF WRIST: CPT | Mod: 26,LT

## 2022-01-28 PROCEDURE — 99284 EMERGENCY DEPT VISIT MOD MDM: CPT | Mod: 25

## 2022-01-28 PROCEDURE — 73110 X-RAY EXAM OF WRIST: CPT

## 2022-01-28 RX ORDER — IBUPROFEN 200 MG
600 TABLET ORAL ONCE
Refills: 0 | Status: COMPLETED | OUTPATIENT
Start: 2022-01-28 | End: 2022-01-28

## 2022-01-28 RX ORDER — IBUPROFEN 200 MG
1 TABLET ORAL
Qty: 28 | Refills: 0
Start: 2022-01-28 | End: 2022-02-03

## 2022-01-28 RX ORDER — DEXAMETHASONE 0.5 MG/5ML
8 ELIXIR ORAL ONCE
Refills: 0 | Status: COMPLETED | OUTPATIENT
Start: 2022-01-28 | End: 2022-01-28

## 2022-01-28 RX ADMIN — Medication 8 MILLIGRAM(S): at 18:30

## 2022-01-28 RX ADMIN — Medication 600 MILLIGRAM(S): at 16:25

## 2022-01-28 NOTE — ED PROVIDER NOTE - OBJECTIVE STATEMENT
42F presents to the ED c/o left hand swelling x 1 day. Pt states that she woke up this morning with pain/swelling of the left hand. Pt denies known injury/trauma, fever/chills, numbness/tingling and has no other complaints at this time.

## 2022-01-28 NOTE — ED PROVIDER NOTE - SKIN, MLM
Let the patient know we need to refer her to hematology to evaulate her anemia- her work up w/ GI was normal  Would she like to see Dr Yumiko Caal again?  We can send her the most recent labs
Pt  informed of Beverly Masters message  He asked which Hematologist should she see? They have know idea who to see 
Pt  informed of Williams Suh message  He said they will give Dr Espino a call 
She used to see dr Anamika robles for her cancer  She can go back and see her if she would like   If not ill just refer to one of our doctors
Skin normal color for race, warm, dry and intact. No evidence of rash. No significant erythema/warmth.

## 2022-01-28 NOTE — ED PROVIDER NOTE - CARE PROVIDER_API CALL
Zabrina Vasquez)  Orthopaedic Surgery; Surgery of the Hand  [address] [place] [address]  Phone: [phone]  Fax: [phone]  Follow Up Time:

## 2022-01-28 NOTE — ED PROVIDER NOTE - MUSCULOSKELETAL, MLM
+ TTP over the dorsal 3rd MCP with mild localized swelling. +flexion/extension of all fingers intact, although limited secondary to pain. +flexion/extension of wrist with mild pain of the dorsal hand with performed movement.

## 2022-01-28 NOTE — ED PROVIDER NOTE - PATIENT PORTAL LINK FT
You can access the FollowMyHealth Patient Portal offered by [facility] by registering at the following website: [email or web address]. By joining [employer]’s FollowMyHealth portal, you will also be able to view your health information using other applications (apps) compatible with our system.

## 2022-01-28 NOTE — ED PROVIDER NOTE - ATTENDING CONTRIBUTION TO CARE
41 y/o F presents for atraumatic left hand swelling x 1 day, pain is worse with extension movement, improved with flexion movement, denies fevers, weakness, nausea/tingling.    AP - NAD, well appearing, left hand with edema, no erythema, edema is more pronounced over the dorsal aspect of the left 3rd MCP joint, + TTP over the flexor surface of the left 3rd finger, decreased ROM of the left 3rd finger, negative Tinel's sign, unable to fully close hand due to pain.    XR negative, anti-inflammatory - follow with hand.

## 2022-01-28 NOTE — ED PROVIDER NOTE - NSPTACCESSSVCSAPPTDETAILS_ED_ALL_ED_FT
Follow up with hand specialist within 1 week. Return to the ED if you experience increased pain/swelling, fever/chills, redness.

## 2022-01-28 NOTE — ED PROVIDER NOTE - PROGRESS NOTE DETAILS
Pt afebrile with no erythema/warmth noted. Likely inflammatory, will tx with ibuprofen, decadron and hand follow up. Splint applied for comfort. Pt instructed to return to the ED if she develops fever/chills, worsening swelling/pain, redness.

## 2022-02-22 ENCOUNTER — APPOINTMENT (OUTPATIENT)
Dept: OBGYN | Facility: CLINIC | Age: 42
End: 2022-02-22
Payer: MEDICAID

## 2022-02-22 VITALS
WEIGHT: 225.31 LBS | DIASTOLIC BLOOD PRESSURE: 69 MMHG | HEIGHT: 69 IN | BODY MASS INDEX: 33.37 KG/M2 | HEART RATE: 71 BPM | SYSTOLIC BLOOD PRESSURE: 112 MMHG

## 2022-02-22 DIAGNOSIS — Z12.39 ENCOUNTER FOR OTHER SCREENING FOR MALIGNANT NEOPLASM OF BREAST: ICD-10-CM

## 2022-02-22 PROCEDURE — 99396 PREV VISIT EST AGE 40-64: CPT

## 2022-03-01 LAB — CYTOLOGY CVX/VAG DOC THIN PREP: NORMAL

## 2022-04-05 ENCOUNTER — EMERGENCY (EMERGENCY)
Facility: HOSPITAL | Age: 42
LOS: 1 days | Discharge: HOME | End: 2022-04-05
Attending: EMERGENCY MEDICINE
Payer: COMMERCIAL

## 2022-04-05 VITALS
TEMPERATURE: 98 F | RESPIRATION RATE: 17 BRPM | HEIGHT: 69 IN | DIASTOLIC BLOOD PRESSURE: 83 MMHG | SYSTOLIC BLOOD PRESSURE: 129 MMHG | OXYGEN SATURATION: 100 % | HEART RATE: 89 BPM

## 2022-04-05 DIAGNOSIS — Q36.0 CLEFT LIP, BILATERAL: Chronic | ICD-10-CM

## 2022-04-05 PROCEDURE — 99283 EMERGENCY DEPT VISIT LOW MDM: CPT

## 2022-04-05 PROCEDURE — 99284 EMERGENCY DEPT VISIT MOD MDM: CPT

## 2022-04-05 RX ORDER — VALACYCLOVIR 500 MG/1
1 TABLET, FILM COATED ORAL
Qty: 14 | Refills: 0
Start: 2022-04-05 | End: 2022-04-11

## 2022-04-05 RX ORDER — VALACYCLOVIR 500 MG/1
1000 TABLET, FILM COATED ORAL ONCE
Refills: 0 | Status: COMPLETED | OUTPATIENT
Start: 2022-04-05 | End: 2022-04-05

## 2022-04-05 RX ADMIN — VALACYCLOVIR 1000 MILLIGRAM(S): 500 TABLET, FILM COATED ORAL at 13:24

## 2022-04-05 RX ADMIN — Medication 60 MILLIGRAM(S): at 13:24

## 2022-04-05 NOTE — ED ADULT TRIAGE NOTE - CHIEF COMPLAINT QUOTE
pt states she is having tingling/numbness to left side of face that began at 2000 last night.  MD David to ravi pt at 1228.

## 2022-04-05 NOTE — ED PROVIDER NOTE - CARE PROVIDER_API CALL
Theodore Ace; PhD)  Neurology; Vascular Neurology  [address] [place] [address]  Phone: [phone]  Fax: [phone]  Follow Up Time:

## 2022-04-05 NOTE — ED PROVIDER NOTE - CARE PROVIDERS DIRECT ADDRESSES
[email or web address]

## 2022-04-05 NOTE — ED PROVIDER NOTE - PATIENT PORTAL LINK FT
You can access the FollowMyHealth Patient Portal offered by [facility] by registering at the following website: [email or web address]. By joining [employer]’s FollowMyHealth portal, you will also be able to view your health information using other applications (apps) compatible with our system.

## 2022-04-12 NOTE — ED ADULT NURSE NOTE - NS ED NURSE DISCH DISPOSITION
Dr. Sol Calles office called to obtain L breast US results. Cesia faxed with confirmation received. Dr. Sol Calles office called back to request mammo results from 4/8/2022 as the report was addended after US was complete. Faxed with confirmation received. Discharged

## 2022-04-26 ENCOUNTER — APPOINTMENT (OUTPATIENT)
Dept: NEUROLOGY | Facility: CLINIC | Age: 42
End: 2022-04-26
Payer: MEDICAID

## 2022-04-26 VITALS — BODY MASS INDEX: 32.58 KG/M2 | HEIGHT: 69 IN | WEIGHT: 220 LBS

## 2022-04-26 PROCEDURE — 99204 OFFICE O/P NEW MOD 45 MIN: CPT

## 2022-04-26 RX ORDER — PRENATAL VIT NO.130/IRON/FOLIC 27MG-0.8MG
28-0.8 TABLET ORAL DAILY
Qty: 90 | Refills: 3 | Status: COMPLETED | COMMUNITY
Start: 2020-07-06 | End: 2022-04-26

## 2022-04-26 NOTE — REVIEW OF SYSTEMS
[As Noted in HPI] : as noted in HPI [Facial Weakness] : facial weakness [Tension Headache] : tension-type headaches [Negative] : Heme/Lymph

## 2022-04-26 NOTE — HISTORY OF PRESENT ILLNESS
[FreeTextEntry1] : Initial office visit April 26, 2022:\par This is a 42-year-old woman who presents today for neurologic evaluation of Bell's palsy.  She went to [facility] emergency room on April 5 with complaint of left-sided weakness and numbness of her face.  She was diagnosed with Bell's palsy and discharged with prednisone and valacyclovir.  The facial weakness has mostly resolved.  She is experiencing headaches mostly at work.  She states that this is occurred since she was diagnosed with a Bell's palsy.  The headaches are holoacranial and pressure-like.  She is here today for neurologic evaluation.

## 2022-04-26 NOTE — REASON FOR VISIT
[Consultation] : a consultation visit [[employer] ] : provided by [employer]   [FreeTextEntry1] : bells palsy and headache [TWNoteComboBox1] : [place]

## 2022-04-26 NOTE — ASSESSMENT
[FreeTextEntry1] : This is a 42-year-old woman with a mostly resolved Bell's palsy on her left side of her face.  No further treatment for this is necessary.  Since then she has been having headaches.  From her description appear to be more tension in nature.  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  She thinks this may be leading to her headaches.  I have given her a note for her to remain out of work until next Monday.  I also recommend that she try ibuprofen for her headaches.  Other than the slight residual Bell's palsy her neurologic examination was normal today.  I will see her back in the office as needed.

## 2022-04-26 NOTE — PHYSICAL EXAM
[General Appearance - Alert] : alert [General Appearance - In No Acute Distress] : in no acute distress [General Appearance - Well Nourished] : well nourished [General Appearance - Well Developed] : well developed [Person] : oriented to person [Place] : oriented to place [Time] : oriented to time [Remote Intact] : remote memory intact [Registration Intact] : recent registration memory intact [Span Intact] : the attention span was normal [Concentration Intact] : normal concentrating ability [Visual Intact] : visual attention was ~T not ~L decreased [Naming Objects] : no difficulty naming common objects [Repeating Phrases] : no difficulty repeating a phrase [Fluency] : fluency intact [Comprehension] : comprehension intact [Past History] : adequate knowledge of personal past history [Cranial Nerves Optic (II)] : visual acuity intact bilaterally,  visual fields full to confrontation, pupils equal round and reactive to light [Cranial Nerves Oculomotor (III)] : extraocular motion intact [Cranial Nerves Trigeminal (V)] : facial sensation intact symmetrically [Cranial Nerves Vestibulocochlear (VIII)] : hearing was intact bilaterally [Cranial Nerves Glossopharyngeal (IX)] : tongue and palate midline [Cranial Nerves Accessory (XI - Cranial And Spinal)] : head turning and shoulder shrug symmetric [Cranial Nerves Hypoglossal (XII)] : there was no tongue deviation with protrusion [Cranial Nerves Facial Peripheral - Left Only] : peripheral 7th nerve weakness [Cranial Nerves Left Facial: House Grade ___(1-6)] : grade II (slight, 80%) facial nerve function [Motor Tone] : muscle tone was normal in all four extremities [Motor Strength] : muscle strength was normal in all four extremities [Involuntary Movements] : no involuntary movements were seen [No Muscle Atrophy] : normal bulk in all four extremities [Paresis Pronator Drift Right-Sided] : no pronator drift on the right [Paresis Pronator Drift Left-Sided] : no pronator drift on the left [Motor Strength Upper Extremities Bilaterally] : strength was normal in both upper extremities [Motor Strength Lower Extremities Bilaterally] : strength was normal in both lower extremities [Sensation Tactile Decrease] : light touch was intact [Sensation Pain / Temperature Decrease] : pain and temperature was intact [Sensation Vibration Decrease] : vibration was intact [Proprioception] : proprioception was intact [Abnormal Walk] : normal gait [Balance] : balance was intact [Tremor] : no tremor present [Coordination - Dysmetria Impaired Finger-to-Nose Bilateral] : not present [2+] : Patella left 2+ [Sclera] : the sclera and conjunctiva were normal [PERRL With Normal Accommodation] : pupils were equal in size, round, reactive to light, with normal accommodation [Extraocular Movements] : extraocular movements were intact [No APD] : no afferent pupillary defect [No QUIN] : no internuclear ophthalmoplegia [Full Visual Field] : full visual field

## 2022-07-15 ENCOUNTER — EMERGENCY (EMERGENCY)
Facility: HOSPITAL | Age: 42
LOS: 1 days | Discharge: HOME | End: 2022-07-15
Attending: EMERGENCY MEDICINE
Payer: COMMERCIAL

## 2022-07-15 VITALS
TEMPERATURE: 99 F | DIASTOLIC BLOOD PRESSURE: 70 MMHG | HEIGHT: 69 IN | WEIGHT: 220.02 LBS | RESPIRATION RATE: 18 BRPM | HEART RATE: 93 BPM | SYSTOLIC BLOOD PRESSURE: 121 MMHG | OXYGEN SATURATION: 98 %

## 2022-07-15 DIAGNOSIS — Q36.0 CLEFT LIP, BILATERAL: Chronic | ICD-10-CM

## 2022-07-15 LAB
APPEARANCE UR: CLEAR — SIGNIFICANT CHANGE UP
BILIRUB UR-MCNC: NEGATIVE — SIGNIFICANT CHANGE UP
COLOR SPEC: YELLOW — SIGNIFICANT CHANGE UP
DIFF PNL FLD: ABNORMAL
EPI CELLS # UR: SIGNIFICANT CHANGE UP
GLUCOSE UR QL: NEGATIVE MG/DL — SIGNIFICANT CHANGE UP
HCG UR QL: NEGATIVE — SIGNIFICANT CHANGE UP
KETONES UR-MCNC: NEGATIVE — SIGNIFICANT CHANGE UP
LEUKOCYTE ESTERASE UR-ACNC: ABNORMAL
NITRITE UR-MCNC: NEGATIVE — SIGNIFICANT CHANGE UP
PH UR: 6 — SIGNIFICANT CHANGE UP (ref 5–8)
PROT UR-MCNC: NEGATIVE — SIGNIFICANT CHANGE UP
RBC CASTS # UR COMP ASSIST: ABNORMAL /HPF (ref 0–4)
SP GR SPEC: 1.02 — SIGNIFICANT CHANGE UP (ref 1.01–1.02)
UROBILINOGEN FLD QL: 1 MG/DL
WBC UR QL: SIGNIFICANT CHANGE UP /HPF (ref 0–5)

## 2022-07-15 PROCEDURE — G1004: CPT

## 2022-07-15 PROCEDURE — 81001 URINALYSIS AUTO W/SCOPE: CPT

## 2022-07-15 PROCEDURE — 96374 THER/PROPH/DIAG INJ IV PUSH: CPT

## 2022-07-15 PROCEDURE — 99284 EMERGENCY DEPT VISIT MOD MDM: CPT | Mod: 25

## 2022-07-15 PROCEDURE — 81025 URINE PREGNANCY TEST: CPT

## 2022-07-15 PROCEDURE — 72131 CT LUMBAR SPINE W/O DYE: CPT | Mod: MG

## 2022-07-15 PROCEDURE — 99284 EMERGENCY DEPT VISIT MOD MDM: CPT

## 2022-07-15 RX ORDER — KETOROLAC TROMETHAMINE 30 MG/ML
30 SYRINGE (ML) INJECTION ONCE
Refills: 0 | Status: DISCONTINUED | OUTPATIENT
Start: 2022-07-15 | End: 2022-07-15

## 2022-07-15 RX ADMIN — Medication 30 MILLIGRAM(S): at 21:08

## 2022-07-15 NOTE — ED ADULT TRIAGE NOTE - CHIEF COMPLAINT QUOTE
patient with complaints of right leg pain, states that it started this morning and progressed during the day, states that the pain was radiating down to her knee denies any trauma or falls

## 2022-07-15 NOTE — ED STATDOCS - PROGRESS NOTE DETAILS
ct negative, pt ambulatory with steady gait all over ED asking to leave multiple times. Alvin saldivar

## 2022-07-15 NOTE — ED STATDOCS - NS ED SCRIBE STATEMENT
Consent: The patient's consent was obtained including but not limited to risks of crusting, scabbing, blistering, scarring, darker or lighter pigmentary change, recurrence, incomplete removal and infection. Render Post-Care Instructions In Note?: no Detail Level: Simple Post-Care Instructions: I reviewed with the patient in detail post-care instructions. Patient is to wear sunprotection, and avoid picking at any of the treated lesions. Pt may apply Vaseline to crusted or scabbing areas. Duration Of Freeze Thaw-Cycle (Seconds): 0 Attending

## 2022-07-15 NOTE — ED STATDOCS - OBJECTIVE STATEMENT
43 y/o female with no PMHx presents with right hip pain that radiates down leg. Started today, and states she has no other pain such as abdominal or back pain. Pt states she is having trouble ambulating due to the pain.

## 2022-07-15 NOTE — ED STATDOCS - ATTENDING APP SHARED VISIT CONTRIBUTION OF CARE
I, Elizabeth Chang, performed the initial face to face bedside interview with this patient regarding history of present illness, review of symptoms and relevant past medical, social and family history.  I completed an independent physical examination.  I was the initial provider who evaluated this patient. I have signed out the follow up of any pending tests (i.e. labs, radiological studies) to the ACP.  I have communicated the patient’s plan of care and disposition with the ACP.  The history, relevant review of systems, past medical and surgical history, medical decision making, and physical examination was documented by the scribe in my presence and I attest to the accuracy of the documentation.

## 2022-07-15 NOTE — ED STATDOCS - NSFOLLOWUPCLINICS_GEN_ALL_ED_FT
[facility] General Orthopedics  Orthopedics  [address] [place] [id number]  Phone: [phone]  Fax:

## 2022-07-15 NOTE — ED STATDOCS - NSFOLLOWUPINSTRUCTIONS_ED_ALL_ED_FT
Follow up with Orthopedics within 1 week   take tylneol for pain every 6 hours   apply lidocaine patches every 12 hours   Apply warm heating packs     return if new or worsening symptoms     Back Pain    WHAT YOU NEED TO KNOW:    Back pain is common. It can be caused by many conditions, such as arthritis or the breakdown of spinal discs. Your risk for back pain is increased by injuries, lack of activity, or repeated bending and twisting. You may feel sore or stiff on one or both sides of your back. The pain may spread to your buttocks or thighs.    DISCHARGE INSTRUCTIONS:    Return to the emergency department if:     You have pain, numbness, or weakness in one or both legs.      Your pain becomes so severe that you cannot walk.      You cannot control your urine or bowel movements.      You have severe back pain with chest pain.      You have severe back pain, nausea, and vomiting.      You have severe back pain that spreads to your side or genital area.    Contact your healthcare provider if:     You have back pain that does not get better with rest and pain medicine.      You have a fever.      You have pain that worsens when you are on your back or when you rest.      You have pain that worsens when you cough or sneeze.      You lose weight without trying.      You have questions or concerns about your condition or care.    Medicines:     NSAIDs help decrease swelling and pain. This medicine is available with or without a doctor's order. NSAIDs can cause stomach bleeding or kidney problems in certain people. If you take blood thinner medicine, always ask your healthcare provider if NSAIDs are safe for you. Always read the medicine label and follow directions.      Acetaminophen decreases pain and fever. It is available without a doctor's order. Ask how much to take and how often to take it. Follow directions. Read the labels of all other medicines you are using to see if they also contain acetaminophen, or ask your doctor or pharmacist. Acetaminophen can cause liver damage if not taken correctly. Do not use more than 4 grams (4,000 milligrams) total of acetaminophen in one day.       Muscle relaxers help decrease muscle spasms and back pain.      Prescription pain medicine may be given. Ask your healthcare provider how to take this medicine safely. Some prescription pain medicines contain acetaminophen. Do not take other medicines that contain acetaminophen without talking to your healthcare provider. Too much acetaminophen may cause liver damage. Prescription pain medicine may cause constipation. Ask your healthcare provider how to prevent or treat constipation.       Take your medicine as directed. Contact your healthcare provider if you think your medicine is not helping or if you have side effects. Tell him or her if you are allergic to any medicine. Keep a list of the medicines, vitamins, and herbs you take. Include the amounts, and when and why you take them. Bring the list or the pill bottles to follow-up visits. Carry your medicine list with you in case of an emergency.    How to manage your back pain:     Apply ice on your back for 15 to 20 minutes every hour or as directed. Use an ice pack, or put crushed ice in a plastic bag. Cover it with a towel before you apply it to your skin. Ice helps prevent tissue damage and decreases pain.      Apply heat on your back for 20 to 30 minutes every 2 hours for as many days as directed. Heat helps decrease pain and muscle spasms.      Stay active as much as you can without causing more pain. Bed rest could make your back pain worse. Avoid heavy lifting until your pain is gone.      Go to physical therapy as directed. A physical therapist can teach you exercises to help improve movement and strength, and to decrease pain.    Follow up with your healthcare provider in 2 weeks, or as directed: Write down your questions so you remember to ask them during your visits.

## 2022-07-15 NOTE — ED STATDOCS - PATIENT PORTAL LINK FT
You can access the FollowMyHealth Patient Portal offered by [facility] by registering at the following website: [email or web address]. By joining [employer]’s FollowMyHealth portal, you will also be able to view your health information using other applications (apps) compatible with our system.

## 2022-07-16 PROCEDURE — 72131 CT LUMBAR SPINE W/O DYE: CPT | Mod: 26,MG

## 2022-07-16 PROCEDURE — G1004: CPT

## 2022-07-16 NOTE — ED ADULT NURSE NOTE - NSICDXPASTSURGICALHX_GEN_ALL_CORE_FT
"Keanu Spence is a 69 y.o. male who is being evaluated via a billable telephone visit.      Keanu Spence complains of    Chief Complaint   Patient presents with     Cough     x 1 year, productive. plegm is getting darker and thicker, had prednisone and advair prescribed. having a hard time keeping his BS's in range while using these. did double glipizide.     Assessment/Plan:    1. Heart failure with reduced ejection fraction, NYHA class II   This is moderately severe ischemic cardiomyopathy. Likely playing a role in his cough and orthopnea.  Will increase furosemide to 40 mg po two times a day, and follow up in three days.  He is not having chest pain, but some increase in LE edema. Doubt COPD exacerbation. He is using his chronic inhaler.      2. Peripheral vascular disease (H)  Some pain in the LLE post fempop bypass.  Will use prn hydrocodone as needed pending improvement with diuresis.  He has not shown any tendency to abuse or escalation in the past. Also, it may help with his cough.   - HYDROcodone-acetaminophen 5-325 mg per tablet; Take 1 tablet by mouth every 8 (eight) hours as needed for pain.  Dispense: 60 tablet; Refill: 0    Follow up phone call 3 days.      Additional provider notes: he continues to have cough, now increasing with mucoid sputum.  No hemoptysis, or purulence.  Not much increased dyspnea.  It is somewhat worse at night, and there is an element of orthopnea.  No chest pain or fever.  No symptoms of bleeding.  He finds that off prednisone, he FBS's are good - low 100's.      I have reviewed and updated the patient's Past Medical History, Social History, Family History, Allergies and Medication List.    The patient has been notified of following:     \"This telephone visit will be conducted via a call between you and your physician/provider. We have found that certain health care needs can be provided without the need for a physical exam.  This service lets us provide the care you " "need with a short phone conversation.  If a prescription is necessary we can send it directly to your pharmacy.  If lab work is needed we can place an order for that and you can then stop by our lab to have the test done at a later time.    If during the course of the call the physician/provider feels a telephone visit is not appropriate, you will not be charged for this service.\"        Phone call duration: 14 minutes    CA Intake time 5 minutes    Telephone Consent was completed by  staff and confirmed by Ghazala Upton CMA    " PAST SURGICAL HISTORY:  Bilateral cleft lip, complete during childhood

## 2022-09-20 NOTE — ED ADULT NURSE NOTE - NSFALLRSKINDICATORS_ED_ALL_ED
Subjective:       Patient ID: Susy Foy is a 73 y.o. female.    Chief Complaint   Patient presents with    Left Hip - Injury     4 week f/u left hip hemiarthroplasty, ambulates with walker.        Patient is here today for follow-up evaluation of 4 weeks status post left hip hemiarthroplasty.  She is doing well.  She complains of some soreness in the hip.  She has had no fevers or chills and reports no drainage from her incision.  She is working with physical therapy at home.  She is ambulating with a walker.  She is requesting a refill on her pain medication today, states that she is taking 1-2 a day in the morning and sometimes before bed.    Injury  Pertinent negatives include no abdominal pain, chest pain, chills, congestion, coughing, fever, nausea, neck pain, numbness or vomiting.     Review of Systems   Constitutional: Negative for chills, fever and malaise/fatigue.   HENT:  Negative for congestion and hearing loss.    Eyes:  Negative for visual disturbance.   Cardiovascular:  Negative for chest pain and syncope.   Respiratory:  Negative for cough and shortness of breath.    Hematologic/Lymphatic: Does not bruise/bleed easily.   Skin:  Negative for color change and suspicious lesions.   Musculoskeletal:  Negative for falls and neck pain.   Gastrointestinal:  Negative for abdominal pain, nausea and vomiting.   Genitourinary:  Negative for dysuria and hematuria.   Neurological:  Negative for numbness and sensory change.   Psychiatric/Behavioral:  Negative for altered mental status. The patient is not nervous/anxious.       Current Outpatient Medications on File Prior to Visit   Medication Sig Dispense Refill    acetaminophen (TYLENOL) 325 MG tablet Take 2 tablets (650 mg total) by mouth every 6 (six) hours as needed for Pain.  0    amLODIPine (NORVASC) 5 MG tablet Take 5 mg by mouth once daily.      aspirin (ECOTRIN) 81 MG EC tablet Take 81 mg by mouth once daily.      baclofen (LIORESAL) 10 MG tablet  "Take 10 mg by mouth 3 (three) times daily.      cabotegravir/rilpivirine (CABENUVA IM) Inject into the muscle every 30 days.      cholecalciferol, vitamin D3, 125 mcg (5,000 unit) Tab Take 5,000 Units by mouth once daily.      docusate sodium (COLACE) 100 MG capsule Take 100 mg by mouth every evening.      donepeziL (ARICEPT) 5 MG tablet Take 5 mg by mouth every evening.      enoxaparin (LOVENOX) 40 mg/0.4 mL Syrg Inject 0.4 mLs (40 mg total) into the skin once daily.      gabapentin (NEURONTIN) 300 MG capsule Take 300 mg by mouth 2 (two) times daily.      melatonin 10 mg Cap Take by mouth.      memantine (NAMENDA) 10 MG Tab Take 5 mg by mouth 2 (two) times daily.      midodrine (PROAMATINE) 5 MG Tab Take 5 mg by mouth 2 (two) times daily with meals.      oxybutynin (DITROPAN-XL) 10 MG 24 hr tablet Take 10 mg by mouth once daily.      pantoprazole (PROTONIX) 40 MG tablet Take 40 mg by mouth once daily.      polyethylene glycol (GLYCOLAX) 17 gram PwPk Take 17 g by mouth once daily.  0    psyllium (METAMUCIL) powder Take 1 packet by mouth once daily.  12    rosuvastatin (CRESTOR) 40 MG Tab Take 10 mg by mouth every evening.      traZODone (DESYREL) 100 MG tablet Take 100 mg by mouth every evening.      vortioxetine (TRINTELLIX) 10 mg Tab Take by mouth.       No current facility-administered medications on file prior to visit.          Objective:      /68   Pulse 72   Resp 18   Ht 5' 5" (1.651 m)   Wt 77.1 kg (169 lb 15.6 oz)   BMI 28.29 kg/m²   Physical Exam  Musculoskeletal:      Comments: Left lower extremity:  Surgical incision with a very small and superficial area of dehiscence in the midportion with dry fibrinous exudate, no surrounding erythema, no fluctuance.  No thigh or calf swelling, no signs of DVT.  5/5 motor strength distally.      Body mass index is 28.29 kg/m².    Radiology:   AP pelvis and left hip two views:  Prosthesis intact.  Alignment unchanged compared to postoperative films.    "   Assessment:         1. Closed fracture of neck of left femur with routine healing, subsequent encounter  X-Ray Hip 2 or 3 views Left (with Pelvis when performed)              Plan:       She is doing well today I will have her continue to work with physical therapy on strengthening and gait training and range of motion.  I have them treat her superficial wound dehiscence with local wound care, soap and water and dry dressings.  Continue to monitor closely and return if any change occurs.  Otherwise I will have her follow-up in 2 months with repeat x-rays of her left hip.  She is happy with this plan of care today and all questions and concerns were addressed.    Florentin Gomez MD  Orthopedic Trauma  [facility]      Follow up in about 2 months (around 11/20/2022).    Closed fracture of neck of left femur with routine healing, subsequent encounter  -     X-Ray Hip 2 or 3 views Left (with Pelvis when performed); Future; Expected date: 09/20/2022              Orders Placed This Encounter   Procedures    X-Ray Hip 2 or 3 views Left (with Pelvis when performed)     Standing Status:   Future     Number of Occurrences:   1     Standing Expiration Date:   9/19/2023     Order Specific Question:   May the Radiologist modify the order per protocol to meet the clinical needs of the patient?     Answer:   Yes     Order Specific Question:   Release to patient     Answer:   Immediate       No future appointments.                 no

## 2022-09-21 ENCOUNTER — APPOINTMENT (OUTPATIENT)
Dept: OBGYN | Facility: CLINIC | Age: 42
End: 2022-09-21

## 2022-09-24 NOTE — ED ADULT NURSE NOTE - PERIPHERAL VASCULAR
"Source of History:  Patient    Chief complaint:  Dizziness (Pt co dizziness onset 4 days ago.  Pt c.o chest soreness when moving. Pt lifts heavy objects at work and states she feels like she pulled a muscle.  AAO x 3 nadn skin wkarlee )      HPI:  Cleopatra Asif is a 33 y.o. female presenting with complaint of feeling flushed and lightheadedness that began 4 days ago.  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  Denies any syncopal episodes.  She also reports some right anterior chest pain that occurred after moving some heavy boxes at work, these symptoms have resolved however she went to be evaluated.  Denies any chest pain or shortness of breath or any fever/chills.    This is the extent to the patients complaints today here in the emergency department.    Review of patient's allergies indicates:  No Known Allergies    PMH:  As per HPI and below:  History reviewed. No pertinent past medical history.  Past Surgical History:   Procedure Laterality Date    HERNIA REPAIR      NONE N/A 9/24/2014    none         Social History     Tobacco Use    Smoking status: Never    Smokeless tobacco: Never   Substance Use Topics    Alcohol use: No    Drug use: No       ROS: As per HPI and below:  Constitutional: No fever.  No chills.  Eyes: No visual changes.  ENT: No sore throat. No ear pain.  Cardiovascular:  Chest pain  Respiratory:  No shortness of breath  GI: No  abdominal pain.  No nausea or vomiting.  Genitourinary: No abnormal urination.  Neurologic: No headache. No focal weakness.  No numbness.  Lightheaded, flushed  MSK: no back pain.  Integument: No rashes or lesions.  Hematologic: No easy bruising.  Endocrine: No excessive thirst or urination.    Physical Exam:    BP (!) 153/93   Pulse 66   Temp 98 °F (36.7 °C)   Resp 18   Ht 5' 11" (1.803 m)   Wt 113.4 kg (250 lb)   SpO2 98%   BMI 34.87 kg/m²   Vitals:    09/24/22 1333 09/24/22 1703   BP: (!) 163/87 (!) " "153/93   Pulse: 88 66   Resp: 18 18   Temp: 98.4 °F (36.9 °C) 98 °F (36.7 °C)   TempSrc: Oral    SpO2: 98% 98%   Weight: 113.4 kg (250 lb)    Height: 5' 11" (1.803 m)      Nursing note and vital signs reviewed.  Constitutional: No acute distress.  Well-appearing, non-toxic.  Eyes: No conjunctival injection.  Extraocular muscles are intact.  No nystagmus  ENT: Oropharynx clear.  Mucous membranes are pink and moist  Cardiovascular:  Regular rate  and rhythm no murmurs gallops or rubs.  Chest/ Respiratory:  Clear to auscultation bilaterally without wheezing rhonchi or rales.  No chest wall tenderness, crepitus, bruising.  No flail chest, erythema or swelling.  Abdomen: Soft.  Not distended.  Nontender.  No guarding.  No rebound. Non-peritoneal.  Musculoskeletal: Good range of motion all joints.  No deformities.  Neck supple.  No meningismus.  Skin: No rashes seen.  Good turgor.  No abrasions.  No ecchymoses.  Neuro: alert and oriented x3,  no focal neurological deficits.  Psych: Appropriate, conversant    Labs that have been ordered have been independently reviewed and interpreted by myself.    Labs Reviewed   COMPREHENSIVE METABOLIC PANEL - Abnormal; Notable for the following components:       Result Value    CO2 21 (*)     Calcium 8.5 (*)     All other components within normal limits   CK - Abnormal; Notable for the following components:     (*)     All other components within normal limits   CBC W/ AUTO DIFFERENTIAL - Abnormal; Notable for the following components:    Hematocrit 36.6 (*)     All other components within normal limits   TROPONIN I   HIV 1 / 2 ANTIBODY    Narrative:     Release to patient->Immediate   HEPATITIS C ANTIBODY    Narrative:     Release to patient->Immediate   POCT URINE PREGNANCY       No orders to display       No results found for this or any previous visit.    Differential Diagnosis:  Differential Diagnosis includes, but is not limited to:  ACS/MI, PE, aortic dissection, " pneumothorax, cardiac tamponade, pericarditis/myocarditis, pneumonia, infection/abscess, lung mass, costochondritis/pleurisy, GERD, biliary disease, pancreatitis    MDM  33 y.o. 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  CPK slightly elevated at 330.  No electrolyte imbalances.  Treated with fluids and patient reports significant improvement in symptoms.  She is able tolerate p.o. challenge on emergency department.  Patient also had some anterior chest pain which is noted after moving some heavy boxes at work.  Symptoms have resolved prior to visit the emergency department.  On exam nontender to palpation, pain was not reproducible while in the ED. EKG normal sinus rhythm, no ectopy, no ischemia.  Heart rate 85.  Troponin was negative in the emergency department.  This likely a muscle strain/sprain however symptoms have resolved.  Did discuss anti-inflammatories if symptoms return.  Strict return precautions given to the patient if her symptoms return she has any other concerns or develops any worsening lightheadedness she can return to emerge department for re-evaluation.  She stated understanding.            Diagnostic Impression:    1. Dehydration    2. Chest pain         ED Disposition Condition    Discharge Stable            ED Prescriptions    None       Follow-up Information       Follow up With Specialties Details Why Contact Info    [facility] - Emergency Dept Emergency Medicine Go to  If symptoms worsen [address] [facility] [id number] [phone]             Romelia Rome, OSMIN  09/25/22 5896     WDL

## 2022-09-26 ENCOUNTER — APPOINTMENT (OUTPATIENT)
Dept: OBGYN | Facility: CLINIC | Age: 42
End: 2022-09-26

## 2022-09-26 VITALS
DIASTOLIC BLOOD PRESSURE: 66 MMHG | SYSTOLIC BLOOD PRESSURE: 122 MMHG | BODY MASS INDEX: 32.58 KG/M2 | WEIGHT: 220 LBS | HEIGHT: 69 IN

## 2022-09-26 PROCEDURE — 99213 OFFICE O/P EST LOW 20 MIN: CPT

## 2022-10-24 ENCOUNTER — APPOINTMENT (OUTPATIENT)
Dept: OBGYN | Facility: CLINIC | Age: 42
End: 2022-10-24

## 2022-10-28 ENCOUNTER — APPOINTMENT (OUTPATIENT)
Dept: OBGYN | Facility: CLINIC | Age: 42
End: 2022-10-28

## 2022-11-10 NOTE — ED STATDOCS - NSCAREINITIATED _GEN_ER
[TextBox_4] : 87 Year old male with dyspnea on exertion. He reports that he is using Symbicort at least once a day and he feels that it might be improving his dyspnea. He reports that he is able to walk several blocks without stopping. On a previous visit I walked him approximately 500 feet and his oxygen saturations were maintained above 94% and his heart rate stayed under 100 and he did not appear dyspneic. His lung functions did show obstructive airways disease with no history of prior lung disease or smoking. There is a strong family history of atopic disease. His CAT scan demonstrated a granuloma and one subcentimeter nodule but no other evidence of lung parenchymal disease. There were very small bilateral pleural effusions.\par The patient has hypertension for which he is taking ibresartan hydrochlorothiazide and amlodipine and diabetes for which he is taking Januvia and her repaglinide
Ramya Rutherford(Attending)

## 2022-11-18 ENCOUNTER — APPOINTMENT (OUTPATIENT)
Dept: OBGYN | Facility: CLINIC | Age: 42
End: 2022-11-18

## 2022-12-13 NOTE — ED PROVIDER NOTE - HISTORY ATTESTATION, MLM
" is a 17 year old who is being evaluated via a billable video visit.      How would you like to obtain your AVS? MyChart  If the video visit is dropped, the invitation should be resent by: Text to cell phone: [phone]  Will anyone else be joining your video visit? Linh Mccord VF      Video-Visit Details    Video Start Time: 3:30 PM  Type of service:  Video Visit  Video End Time: 3:45 PM  Originating Location (pt. Location): Home        Distant Location (provider location):  On-site  Platform used for Video Visit: [employer]   ________________________________________________________________    PATIENT:  Princess LOBO Maria  :  2005  JALEESA:  Dec 13, 2022  Medical Nutrition Therapy  Nutrition Reassessment   is a 17 year old year old female seen for follow-up in [facility] with obesity.  was referred by Dr. Ernie Rivera for nutrition education and counseling.      Anthropometrics  Weight:    Wt Readings from Last 4 Encounters:   22 87.5 kg (193 lb) (97 %, Z= 1.92)*   22 88.1 kg (194 lb 3.6 oz) (97 %, Z= 1.94)*   22 88.8 kg (195 lb 12.8 oz) (98 %, Z= 1.97)*   22 88.3 kg (194 lb 9.6 oz) (97 %, Z= 1.96)*     * Growth percentiles are based on CDC (Girls, 2-20 Years) data.     Height:    Ht Readings from Last 2 Encounters:   22 1.659 m (5' 5.32\") (68 %, Z= 0.46)*   22 1.67 m (5' 5.75\") (74 %, Z= 0.64)*     * Growth percentiles are based on CDC (Girls, 2-20 Years) data.     Estimated body mass index is 31.81 kg/m  as calculated from the following:    Height as of 22: 1.659 m (5' 5.32\").    Weight as of an earlier encounter on 22: 87.5 kg (193 lb).    Nutrition History   was last seen in our clinic on  with dietitian and  with Dr. Rivera.  She reports focusing on eating something before the afternoon primarily.  Lately she is bringing something small to eat at lunch daily.  Saida " Patient calling again regarding the below message. Please give patient a call to discuss. Patient requests a call back today if possible. She states her schedule is very difficult tomorrow. prepares her own dinner meals- typically something frozen, many days high calorie frozen entrees.  She continues to walk the dogs, despite cold weather nearly daily.   feels appetite has reduced with medication quite a bit.  Less interest and desire to snack.  Eating something for breakfast continues to be a challenge.              Nutritional Intakes  Breakfast: skipping   Lunch: packing partial meal- meat and cheese 2x/week or popcorn. Otherwise skipping lunch altogether with water.   PM Snack: after school- chips (sunchip- 1 single serving), popcorn if not having it at lunch.   Dinner: 4-6PM will microwave meals (Linda Calendars-lasagna or pot pie) with fruit 2-3x/week, otherwise will make macaroni and cheese, buffalo chicken, packaged frozen foods from [email or web address]. With water or sometimes Celsius  HS Snack: similar as PM.   Beverages: mostly drinks water; will sometimes have juice on the weekends; no soda; no Gatorade, coffee drinks and occasional Celsius  Eating Out: 0x/week     Activity Level   is mildly active. Walking the dog for roughly 20 minutes per day and gym class just ended which was daily.  No gym class this semester.     School Schedule   is attending in-person school 5 days per week.    Medications/Vitamins/Minerals  Reviewed    Nutrition Diagnosis  Obesity related to excessive energy intake as evidenced by BMI/age >95th %ile    Interventions & Education  Reviewed previous nutrition goals and patient's progress since last appointment.  Education provided on healthier alternative for frozen meals.  Discussed ways to drink more water  will try.     Goals  a. Continue previous healthy goals including following plate method, consuming minimal snacks, not eating after 9 PM  b. Will try to have something breakfast (even something like a Belvita sandwich, yogurt, piece of fruit) at least 3 times a week  c. Increase mindfulness of carb portions of rice or pasta - no more than 1  cup rice, no more than 1.5 cups pasta with meals.  d. Continue to work on increasing vegetables. Will have a vegetable at least with dinner every night.     e. Substitute one conventional snack for fruit, and/or have fruit for breakfast.   f. Increase water consumption to 64 oz per day.  Try tactics discussed today to help keep on track drinking water throughout the day.   g. Will continue to walk the dogs at least 5 days per week for at least 20-25 minutes.     Monitoring/Evaluation  Will continue to monitor progress towards goals and provide education in Pediatric Weight Management. Recommend follow up appointment in 3 months.    Spent 15 minutes in consult with patient.      Eladia Peres RDN, LD  Pediatric Dietitian  [facility]  [phone] (voicemail)  [phone] (fax)   I have reviewed and confirmed nurses' notes...

## 2022-12-30 ENCOUNTER — APPOINTMENT (OUTPATIENT)
Dept: OBGYN | Facility: CLINIC | Age: 42
End: 2022-12-30
Payer: MEDICAID

## 2022-12-30 VITALS
SYSTOLIC BLOOD PRESSURE: 120 MMHG | HEIGHT: 69 IN | DIASTOLIC BLOOD PRESSURE: 78 MMHG | WEIGHT: 206 LBS | BODY MASS INDEX: 30.51 KG/M2

## 2022-12-30 PROCEDURE — 99213 OFFICE O/P EST LOW 20 MIN: CPT

## 2022-12-30 NOTE — PLAN
[FreeTextEntry1] : Changed RX prescription. RIsks and benefits reiterated to patient. Advised patient if increase in SI/HI to seek help at closest ED. Pt. verbalized understanding. \par All questions and concerns addressed during encounter. Pt. agreed to plan of care.

## 2022-12-30 NOTE — HISTORY OF PRESENT ILLNESS
[FreeTextEntry1] : 42 year old female present for follow up due to her depression. Pt. has a lot going on at home, finding out her  cheated on her and her brother recently was murdered. She is requesting an increase in her dose of antidepressant. No SI/HI.

## 2023-02-23 ENCOUNTER — APPOINTMENT (OUTPATIENT)
Dept: OBGYN | Facility: CLINIC | Age: 43
End: 2023-02-23
Payer: MEDICAID

## 2023-02-23 VITALS
HEIGHT: 69 IN | DIASTOLIC BLOOD PRESSURE: 70 MMHG | WEIGHT: 202.44 LBS | BODY MASS INDEX: 29.99 KG/M2 | SYSTOLIC BLOOD PRESSURE: 124 MMHG

## 2023-02-23 PROCEDURE — 99396 PREV VISIT EST AGE 40-64: CPT

## 2023-02-23 NOTE — PLAN
[FreeTextEntry1] : Encounter for GYN exam \par \par - Unable to perform pap due to vaginal bleeding \par - Mammogram prescription given \par \par Renewed RX for anxiety/depression \par \par F/U for repeat pap \par All questions and concerns addressed during encounter. Pt. agreed to plan of care.

## 2023-02-23 NOTE — PHYSICAL EXAM
[Chaperone Present] : A chaperone was present in the examining room during all aspects of the physical examination [Appropriately responsive] : appropriately responsive [Alert] : alert [No Acute Distress] : no acute distress [Soft] : soft [Non-tender] : non-tender [Non-distended] : non-distended [No HSM] : No HSM [No Lesions] : no lesions [No Mass] : no mass [Oriented x3] : oriented x3 [Examination Of The Breasts] : a normal appearance [No Masses] : no breast masses were palpable [Labia Majora] : normal [Labia Minora] : normal [Moderate] : There was moderate vaginal bleeding [Normal] : normal [Uterine Adnexae] : normal

## 2023-02-23 NOTE — HISTORY OF PRESENT ILLNESS
[Y] : Patient is sexually active [Frequency: Q ___ days] : menstrual periods occur approximately every [unfilled] days [FreeTextEntry1] : 43 year old female presents for annual examination. Pt. states she has been trying to conceive was previously on clomid where she had questionable PE. No complaints of pelvic pain and vaginal discharge. Pt. has not had mammogram performed in 2 years.  [PGxTotal] : 6 [[facility]] : 2 [PGHxPremature] : 0 [PGHxAbortions] : 4 [[facility]] : 2 [PGHxABInduced] : 0 [PGHxABSpont] : 4 [PGHxEctopic] : 0 [PGHxMultBirths] : 0

## 2023-03-02 ENCOUNTER — APPOINTMENT (OUTPATIENT)
Dept: OBGYN | Facility: CLINIC | Age: 43
End: 2023-03-02

## 2023-03-27 ENCOUNTER — APPOINTMENT (OUTPATIENT)
Dept: OBGYN | Facility: CLINIC | Age: 43
End: 2023-03-27

## 2023-06-12 ENCOUNTER — APPOINTMENT (OUTPATIENT)
Dept: OBGYN | Facility: CLINIC | Age: 43
End: 2023-06-12
Payer: MEDICAID

## 2023-06-12 VITALS
DIASTOLIC BLOOD PRESSURE: 80 MMHG | SYSTOLIC BLOOD PRESSURE: 120 MMHG | WEIGHT: 210 LBS | BODY MASS INDEX: 31.1 KG/M2 | HEIGHT: 69 IN

## 2023-06-12 PROCEDURE — 99213 OFFICE O/P EST LOW 20 MIN: CPT | Mod: TH

## 2023-06-12 RX ORDER — SERTRALINE HYDROCHLORIDE 100 MG/1
100 TABLET, FILM COATED ORAL DAILY
Qty: 90 | Refills: 0 | Status: DISCONTINUED | COMMUNITY
End: 2023-06-12

## 2023-06-12 NOTE — HISTORY OF PRESENT ILLNESS
[N] : Patient does not use contraception [Y] : Positive pregnancy history [Frequency: Q ___ days] : menstrual periods occur approximately every [unfilled] days [Regular Cycle Intervals] : periods have been regular [Menarche Age: ____] : age at menarche was [unfilled] [FreeTextEntry1] : 4 [PGxTotal] : 7 [[facility]] : 2 [PGHxPremature] : 0 [PGHxAbortions] : 3 [[facility]] : 2 [PGxABSpont] : 3 [PGHxABInduced] : 0 [PGxEctopic] : 1 [PGHxMultBirths] : 0

## 2023-06-12 NOTE — PLAN
[FreeTextEntry1] : Encounter for GYN exam \par \par - PAP/GC obtained \par \par Pregnancy at early stage \par \par - PNV sent \par - Bedside sonogram performed today : Gestational sac, yolk sac, fetal pole with FHR identified. \par \par F/U in 2 weeks for dating sonogram and NPN \par All questions and concerns addressed during encounter. Pt. agreed to plan of care.

## 2023-06-12 NOTE — PROCEDURE
[Cervical Pap Smear] : cervical Pap smear [GC & Chlamydia via Pap] : GC & Chlamydia via Pap [Tolerated Well] : the patient tolerated the procedure well [No Complications] : there were no complications [Transvaginal OB Sonogram] : Transvaginal OB Sonogram [Intrauterine Pregnancy] : intrauterine pregnancy [Yolk Sac] : yolk sac present [Fetal Heart] : fetal heart present [Transvaginal OB Sonogram WNL] : Transvaginal OB Sonogram WNL

## 2023-06-12 NOTE — PHYSICAL EXAM
[Chaperone Present] : A chaperone was present in the examining room during all aspects of the physical examination [Appropriately responsive] : appropriately responsive [No Acute Distress] : no acute distress [Alert] : alert [No Lymphadenopathy] : no lymphadenopathy [Regular Rate Rhythm] : regular rate rhythm [No Murmurs] : no murmurs [Clear to Auscultation B/L] : clear to auscultation bilaterally [Soft] : soft [Non-tender] : non-tender [Non-distended] : non-distended [No HSM] : No HSM [No Lesions] : no lesions [No Mass] : no mass [Oriented x3] : oriented x3 [Examination Of The Breasts] : a normal appearance [No Masses] : no breast masses were palpable [Labia Majora] : normal [Labia Minora] : normal [Normal] : normal [Uterine Adnexae] : normal

## 2023-06-12 NOTE — REASON FOR VISIT
[Follow-Up] : a follow-up evaluation of [FreeTextEntry2] : a positive home pregnancy test. The patient also needs a repeat pap smear.

## 2023-06-18 ENCOUNTER — EMERGENCY (EMERGENCY)
Facility: HOSPITAL | Age: 43
LOS: 1 days | Discharge: HOME | End: 2023-06-18
Attending: EMERGENCY MEDICINE
Payer: COMMERCIAL

## 2023-06-18 VITALS
WEIGHT: 210.98 LBS | DIASTOLIC BLOOD PRESSURE: 83 MMHG | RESPIRATION RATE: 18 BRPM | OXYGEN SATURATION: 98 % | SYSTOLIC BLOOD PRESSURE: 128 MMHG | TEMPERATURE: 98 F | HEART RATE: 79 BPM

## 2023-06-18 DIAGNOSIS — Q36.0 CLEFT LIP, BILATERAL: Chronic | ICD-10-CM

## 2023-06-18 LAB
ALBUMIN SERPL ELPH-MCNC: 4.2 G/DL — SIGNIFICANT CHANGE UP (ref 3.3–5.2)
ALP SERPL-CCNC: 68 U/L — SIGNIFICANT CHANGE UP (ref 40–120)
ALT FLD-CCNC: 7 U/L — SIGNIFICANT CHANGE UP
ANION GAP SERPL CALC-SCNC: 15 MMOL/L — SIGNIFICANT CHANGE UP (ref 5–17)
APPEARANCE UR: CLEAR — SIGNIFICANT CHANGE UP
AST SERPL-CCNC: 15 U/L — SIGNIFICANT CHANGE UP
BACTERIA # UR AUTO: ABNORMAL
BASOPHILS # BLD AUTO: 0.05 K/UL — SIGNIFICANT CHANGE UP (ref 0–0.2)
BASOPHILS NFR BLD AUTO: 0.4 % — SIGNIFICANT CHANGE UP (ref 0–2)
BILIRUB SERPL-MCNC: <0.2 MG/DL — LOW (ref 0.4–2)
BILIRUB UR-MCNC: NEGATIVE — SIGNIFICANT CHANGE UP
BUN SERPL-MCNC: 10.1 MG/DL — SIGNIFICANT CHANGE UP (ref 8–20)
CALCIUM SERPL-MCNC: 9.3 MG/DL — SIGNIFICANT CHANGE UP (ref 8.4–10.5)
CHLORIDE SERPL-SCNC: 102 MMOL/L — SIGNIFICANT CHANGE UP (ref 96–108)
CO2 SERPL-SCNC: 21 MMOL/L — LOW (ref 22–29)
COLOR SPEC: YELLOW — SIGNIFICANT CHANGE UP
CREAT SERPL-MCNC: 0.95 MG/DL — SIGNIFICANT CHANGE UP (ref 0.5–1.3)
DIFF PNL FLD: ABNORMAL
EGFR: 76 ML/MIN/1.73M2 — SIGNIFICANT CHANGE UP
EOSINOPHIL # BLD AUTO: 0.32 K/UL — SIGNIFICANT CHANGE UP (ref 0–0.5)
EOSINOPHIL NFR BLD AUTO: 2.8 % — SIGNIFICANT CHANGE UP (ref 0–6)
EPI CELLS # UR: SIGNIFICANT CHANGE UP
GLUCOSE SERPL-MCNC: 84 MG/DL — SIGNIFICANT CHANGE UP (ref 70–99)
GLUCOSE UR QL: NEGATIVE MG/DL — SIGNIFICANT CHANGE UP
HCG SERPL-ACNC: HIGH MIU/ML
HCT VFR BLD CALC: 38 % — SIGNIFICANT CHANGE UP (ref 34.5–45)
HGB BLD-MCNC: 12.6 G/DL — SIGNIFICANT CHANGE UP (ref 11.5–15.5)
IMM GRANULOCYTES NFR BLD AUTO: 0.4 % — SIGNIFICANT CHANGE UP (ref 0–0.9)
KETONES UR-MCNC: NEGATIVE — SIGNIFICANT CHANGE UP
LACTATE BLDV-MCNC: 1.6 MMOL/L — SIGNIFICANT CHANGE UP (ref 0.5–2)
LEUKOCYTE ESTERASE UR-ACNC: ABNORMAL
LIDOCAIN IGE QN: 91 U/L — HIGH (ref 22–51)
LYMPHOCYTES # BLD AUTO: 26.2 % — SIGNIFICANT CHANGE UP (ref 13–44)
LYMPHOCYTES # BLD AUTO: 3.05 K/UL — SIGNIFICANT CHANGE UP (ref 1–3.3)
MCHC RBC-ENTMCNC: 30 PG — SIGNIFICANT CHANGE UP (ref 27–34)
MCHC RBC-ENTMCNC: 33.2 GM/DL — SIGNIFICANT CHANGE UP (ref 32–36)
MCV RBC AUTO: 90.5 FL — SIGNIFICANT CHANGE UP (ref 80–100)
MONOCYTES # BLD AUTO: 0.86 K/UL — SIGNIFICANT CHANGE UP (ref 0–0.9)
MONOCYTES NFR BLD AUTO: 7.4 % — SIGNIFICANT CHANGE UP (ref 2–14)
NEUTROPHILS # BLD AUTO: 7.29 K/UL — SIGNIFICANT CHANGE UP (ref 1.8–7.4)
NEUTROPHILS NFR BLD AUTO: 62.8 % — SIGNIFICANT CHANGE UP (ref 43–77)
NITRITE UR-MCNC: NEGATIVE — SIGNIFICANT CHANGE UP
PH UR: 6 — SIGNIFICANT CHANGE UP (ref 5–8)
PLATELET # BLD AUTO: 259 K/UL — SIGNIFICANT CHANGE UP (ref 150–400)
POTASSIUM SERPL-MCNC: 3.9 MMOL/L — SIGNIFICANT CHANGE UP (ref 3.5–5.3)
POTASSIUM SERPL-SCNC: 3.9 MMOL/L — SIGNIFICANT CHANGE UP (ref 3.5–5.3)
PROT SERPL-MCNC: 7.1 G/DL — SIGNIFICANT CHANGE UP (ref 6.6–8.7)
PROT UR-MCNC: NEGATIVE — SIGNIFICANT CHANGE UP
RBC # BLD: 4.2 M/UL — SIGNIFICANT CHANGE UP (ref 3.8–5.2)
RBC # FLD: 13 % — SIGNIFICANT CHANGE UP (ref 10.3–14.5)
RBC CASTS # UR COMP ASSIST: SIGNIFICANT CHANGE UP /HPF (ref 0–4)
SODIUM SERPL-SCNC: 138 MMOL/L — SIGNIFICANT CHANGE UP (ref 135–145)
SP GR SPEC: 1.02 — SIGNIFICANT CHANGE UP (ref 1.01–1.02)
TROPONIN T SERPL-MCNC: <0.01 NG/ML — SIGNIFICANT CHANGE UP (ref 0–0.06)
UROBILINOGEN FLD QL: NEGATIVE MG/DL — SIGNIFICANT CHANGE UP
WBC # BLD: 11.62 K/UL — HIGH (ref 3.8–10.5)
WBC # FLD AUTO: 11.62 K/UL — HIGH (ref 3.8–10.5)
WBC UR QL: SIGNIFICANT CHANGE UP /HPF (ref 0–5)

## 2023-06-18 PROCEDURE — 80053 COMPREHEN METABOLIC PANEL: CPT

## 2023-06-18 PROCEDURE — 83690 ASSAY OF LIPASE: CPT

## 2023-06-18 PROCEDURE — 84484 ASSAY OF TROPONIN QUANT: CPT

## 2023-06-18 PROCEDURE — 93010 ELECTROCARDIOGRAM REPORT: CPT

## 2023-06-18 PROCEDURE — 76817 TRANSVAGINAL US OBSTETRIC: CPT

## 2023-06-18 PROCEDURE — 83605 ASSAY OF LACTIC ACID: CPT

## 2023-06-18 PROCEDURE — 99284 EMERGENCY DEPT VISIT MOD MDM: CPT

## 2023-06-18 PROCEDURE — 76801 OB US < 14 WKS SINGLE FETUS: CPT

## 2023-06-18 PROCEDURE — T1013: CPT

## 2023-06-18 PROCEDURE — 85025 COMPLETE CBC W/AUTO DIFF WBC: CPT

## 2023-06-18 PROCEDURE — 93005 ELECTROCARDIOGRAM TRACING: CPT

## 2023-06-18 PROCEDURE — 76817 TRANSVAGINAL US OBSTETRIC: CPT | Mod: 26

## 2023-06-18 PROCEDURE — 87086 URINE CULTURE/COLONY COUNT: CPT

## 2023-06-18 PROCEDURE — 99285 EMERGENCY DEPT VISIT HI MDM: CPT | Mod: 25

## 2023-06-18 PROCEDURE — 81001 URINALYSIS AUTO W/SCOPE: CPT

## 2023-06-18 PROCEDURE — 84702 CHORIONIC GONADOTROPIN TEST: CPT

## 2023-06-18 PROCEDURE — 76801 OB US < 14 WKS SINGLE FETUS: CPT | Mod: 26

## 2023-06-18 PROCEDURE — 36415 COLL VENOUS BLD VENIPUNCTURE: CPT

## 2023-06-18 RX ORDER — ONDANSETRON 8 MG/1
1 TABLET, FILM COATED ORAL
Qty: 3 | Refills: 0
Start: 2023-06-18

## 2023-06-18 RX ORDER — SODIUM CHLORIDE 9 MG/ML
1000 INJECTION INTRAMUSCULAR; INTRAVENOUS; SUBCUTANEOUS ONCE
Refills: 0 | Status: COMPLETED | OUTPATIENT
Start: 2023-06-18 | End: 2023-06-18

## 2023-06-18 RX ADMIN — SODIUM CHLORIDE 1000 MILLILITER(S): 9 INJECTION INTRAMUSCULAR; INTRAVENOUS; SUBCUTANEOUS at 15:41

## 2023-06-18 NOTE — ED PROVIDER NOTE - CLINICAL SUMMARY MEDICAL DECISION MAKING FREE TEXT BOX
(PAWAN Tena MD) Initial Assessment: 44 y/o female with BPPV likely with dizziness N/V with positional changes also co lower abdominal pain missed period, will check labs, hCG, give IV fluids, US  ACP to complete summary of medical encounter below.    Summary of Clinical Encounter: (PAWAN Tena MD) Initial Assessment: 42 y/o female with BPPV likely with dizziness N/V with positional changes also co lower abdominal pain missed period, will check labs, hCG, give IV fluids, US  ACP to complete summary of medical encounter below.    Summary of Clinical Encounter:  Joann GHOSH : +HCG, US with 8 week IUP. Pt feels better. Has an obgyn to f/u with. pt requested nausea meds sent to pharmacy. return precautions.  nolvia

## 2023-06-18 NOTE — ED ADULT TRIAGE NOTE - CHIEF COMPLAINT QUOTE
vomiting, lower pelvic pain, lower back pain. "I think I'm over a month pregnant" did not take a test. thinks shes pregnant because she's tired and dizzy.  also c/o urinary symptoms.

## 2023-06-18 NOTE — ED PROVIDER NOTE - OBJECTIVE STATEMENT
HPI:  44 y/o F; with PMH signif for ???; now p/w headache, vomiting, dizziness, abdominal discomfort. Pt states she has not had her period in over a month. Pt states she has been dizzy for the past 3 days, worse when going to laying to standing associated with nausea, states dizziness is room spinning in nature. Pt denies recent fevers, chills cough, congestion. Pt notes she developed abdominal pressure yesterday and feels something "hard".    PMH:  SOCIAL: +social EtOH use, denies tobacco/illicit drug use     : Joaquin HPI:  44 y/o F; with no signif PMH; now p/w headache, vomiting, dizziness, abdominal discomfort. Pt states she has not had her period in over a month. Pt states she has been dizzy for the past 3 days, worse when going to laying to standing, sudden onset spinning senation, not present at rest, not associated with headache, tinnitus, numbness/tingling, focal weakness. does report associated with nausea.  also report abd pain--LLQ abd cramping, pressure, x2 days.  Pt denies recent fevers, chills cough, congestion.    : Joaquin

## 2023-06-18 NOTE — ED PROVIDER NOTE - PHYSICAL EXAMINATION
Gen: Alert, NAD  Head: NC, AT, PERRL, EOMI, normal lids/conjunctiva  ENT: B TM WNL, normal hearing, patent oropharynx without erythema/exudate, uvula midline  Neck: +supple, no tenderness/meningismus/JVD, +Trachea midline  Pulm: Bilateral BS, normal resp effort, no wheeze/stridor/retractions  CV: RRR, no M/R/G, 2+dist pulses  Abd: soft, NT/ND, +BS, no hepatosplenomegaly  Mskel: ROM intact x4 extremities.  no edema/erythema/cyanosis  Skin: no rash, warm, dry  Neuro: AAOx3, no sensory/motor deficits, CN 2-12 intact, no dysdiadochokinesia Gen: Alert, NAD  Head: NC, AT, PERRL, EOMI, normal lids/conjunctiva  ENT: B TM WNL,   Neck: +supple, no tenderness/meningismus/JVD, +Trachea midline  Pulm: Bilateral BS, normal resp effort, no wheeze/stridor/retractions  CV: RRR, no M/R/G, 2+dist pulses  Abd: soft, NT/ND, +BS, no hepatosplenomegaly  Mskel: ROM intact x4 extremities.  no edema/erythema/cyanosis  Skin: no rash, warm, dry  Neuro: AAOx3, no sensory/motor deficits, CN 2-12 intact, no dysmetria

## 2023-06-18 NOTE — ED PROVIDER NOTE - PATIENT PORTAL LINK FT
You can access the FollowMyHealth Patient Portal offered by [facility] by registering at the following website: [email or web address]. By joining [facility]’s FollowMyHealth portal, you will also be able to view your health information using other applications (apps) compatible with our system.

## 2023-06-18 NOTE — ED PROVIDER NOTE - NS ED ROS FT
Constitutional: (-) fever  (-)chills  (-)sweats  Eyes/ENT: (-) blurry vision, (-) epistaxis  (-)rhinorrhea   (-) sore throat    Cardiovascular: (-) chest pain, (-) palpitations (-) edema   Respiratory: (-) cough, (-) shortness of breath   Gastrointestinal: (+)nausea  (+)vomiting, (-) diarrhea  (+) abdominal pain   :  (-)dysuria, (-)frequency, (-)urgency, (-)hematuria  Musculoskeletal: (-) neck pain, (-) back pain, (-) joint pain  Integumentary: (-) rash, (-) edema  Neurological: (+) headache, (-) altered mental status  (-)LOC Constitutional: (-) fever  (-)chills  (-)sweats  Eyes/ENT: (-)   Cardiovascular: (-) chest pain, (-) palpitations (-) edema   Respiratory: (-) cough, (-) shortness of breath   Gastrointestinal: (+)nausea  (+)vomiting, (-) diarrhea  (+) abdominal pain   :  (-)dysuria, (-)frequency, (-)urgency, (-)hematuria  Musculoskeletal: (-) neck pain, (-) back pain, (-) joint pain  Integumentary: (-) rash, (-) edema  Neurological: (+) headache, (-) altered mental status  (-)LOC

## 2023-06-20 LAB
CULTURE RESULTS: SIGNIFICANT CHANGE UP
CYTOLOGY CVX/VAG DOC THIN PREP: ABNORMAL
SPECIMEN SOURCE: SIGNIFICANT CHANGE UP

## 2023-06-23 ENCOUNTER — APPOINTMENT (OUTPATIENT)
Dept: ANTEPARTUM | Facility: CLINIC | Age: 43
End: 2023-06-23

## 2023-07-07 ENCOUNTER — APPOINTMENT (OUTPATIENT)
Dept: ANTEPARTUM | Facility: CLINIC | Age: 43
End: 2023-07-07
Payer: MEDICAID

## 2023-07-07 ENCOUNTER — ASOB RESULT (OUTPATIENT)
Age: 43
End: 2023-07-07

## 2023-07-07 PROCEDURE — 76801 OB US < 14 WKS SINGLE FETUS: CPT

## 2023-07-11 ENCOUNTER — APPOINTMENT (OUTPATIENT)
Dept: OBGYN | Facility: CLINIC | Age: 43
End: 2023-07-11
Payer: MEDICAID

## 2023-07-11 ENCOUNTER — LABORATORY RESULT (OUTPATIENT)
Age: 43
End: 2023-07-11

## 2023-07-11 VITALS
BODY MASS INDEX: 30.36 KG/M2 | HEIGHT: 69 IN | WEIGHT: 205 LBS | DIASTOLIC BLOOD PRESSURE: 78 MMHG | SYSTOLIC BLOOD PRESSURE: 122 MMHG

## 2023-07-11 DIAGNOSIS — Z01.411 ENCOUNTER FOR GYNECOLOGICAL EXAMINATION (GENERAL) (ROUTINE) WITH ABNORMAL FINDINGS: ICD-10-CM

## 2023-07-11 DIAGNOSIS — Z86.69 PERSONAL HISTORY OF OTHER DISEASES OF THE NERVOUS SYSTEM AND SENSE ORGANS: ICD-10-CM

## 2023-07-11 DIAGNOSIS — Z82.79 FAMILY HISTORY OF OTHER CONGENITAL MALFORMATIONS, DEFORMATIONS AND CHROMOSOMAL ABNORMALITIES: ICD-10-CM

## 2023-07-11 DIAGNOSIS — N61.1 ABSCESS OF THE BREAST AND NIPPLE: ICD-10-CM

## 2023-07-11 DIAGNOSIS — Z83.3 FAMILY HISTORY OF DIABETES MELLITUS: ICD-10-CM

## 2023-07-11 DIAGNOSIS — N91.1 SECONDARY AMENORRHEA: ICD-10-CM

## 2023-07-11 DIAGNOSIS — Z01.419 ENCOUNTER FOR GYNECOLOGICAL EXAMINATION (GENERAL) (ROUTINE) W/OUT ABNORMAL FINDINGS: ICD-10-CM

## 2023-07-11 DIAGNOSIS — Z12.4 ENCOUNTER FOR SCREENING FOR MALIGNANT NEOPLASM OF CERVIX: ICD-10-CM

## 2023-07-11 DIAGNOSIS — R51.9 HEADACHE, UNSPECIFIED: ICD-10-CM

## 2023-07-11 DIAGNOSIS — Z34.90 ENCOUNTER FOR SUPERVISION OF NORMAL PREGNANCY, UNSPECIFIED, UNSPECIFIED TRIMESTER: ICD-10-CM

## 2023-07-11 DIAGNOSIS — Z87.42 PERSONAL HISTORY OF OTHER DISEASES OF THE FEMALE GENITAL TRACT: ICD-10-CM

## 2023-07-11 DIAGNOSIS — Z28.21 IMMUNIZATION NOT CARRIED OUT BECAUSE OF PATIENT REFUSAL: ICD-10-CM

## 2023-07-11 DIAGNOSIS — O09.529 SUPERVISION OF ELDERLY MULTIGRAVIDA, UNSPECIFIED TRIMESTER: ICD-10-CM

## 2023-07-11 DIAGNOSIS — Z12.39 ENCOUNTER FOR OTHER SCREENING FOR MALIGNANT NEOPLASM OF BREAST: ICD-10-CM

## 2023-07-11 DIAGNOSIS — N92.1 EXCESSIVE AND FREQUENT MENSTRUATION WITH IRREGULAR CYCLE: ICD-10-CM

## 2023-07-11 DIAGNOSIS — Z23 ENCOUNTER FOR IMMUNIZATION: ICD-10-CM

## 2023-07-11 DIAGNOSIS — Z01.811 ENCOUNTER FOR PREPROCEDURAL RESPIRATORY EXAMINATION: ICD-10-CM

## 2023-07-11 DIAGNOSIS — Z87.730 PERSONAL HISTORY OF (CORRECTED) CLEFT LIP AND PALATE: ICD-10-CM

## 2023-07-11 LAB
BILIRUB UR QL STRIP: NORMAL
CLARITY UR: CLEAR
COLLECTION METHOD: NORMAL
GLUCOSE UR-MCNC: NORMAL
HCG UR QL: 1 EU/DL
HGB UR QL STRIP.AUTO: NORMAL
KETONES UR-MCNC: NORMAL
LEUKOCYTE ESTERASE UR QL STRIP: NORMAL
NITRITE UR QL STRIP: NORMAL
PH UR STRIP: 6
PROT UR STRIP-MCNC: 30
SP GR UR STRIP: 1.03

## 2023-07-11 PROCEDURE — 99213 OFFICE O/P EST LOW 20 MIN: CPT | Mod: TH

## 2023-07-12 LAB
ABO + RH PNL BLD: NORMAL
ALBUMIN SERPL ELPH-MCNC: 4.4 G/DL
ALP BLD-CCNC: 84 U/L
ALT SERPL-CCNC: 7 U/L
ANION GAP SERPL CALC-SCNC: 20 MMOL/L
APPEARANCE: CLEAR
AST SERPL-CCNC: 16 U/L
BILIRUB SERPL-MCNC: 0.4 MG/DL
BILIRUBIN URINE: NEGATIVE
BLD GP AB SCN SERPL QL: NORMAL
BLOOD URINE: NEGATIVE
BUN SERPL-MCNC: 7 MG/DL
CALCIUM SERPL-MCNC: 9.3 MG/DL
CHLORIDE SERPL-SCNC: 100 MMOL/L
CO2 SERPL-SCNC: 20 MMOL/L
COLOR: NORMAL
CREAT SERPL-MCNC: 0.63 MG/DL
EGFR: 113 ML/MIN/1.73M2
ESTIMATED AVERAGE GLUCOSE: 114 MG/DL
GLUCOSE QUALITATIVE U: NEGATIVE MG/DL
GLUCOSE SERPL-MCNC: 29 MG/DL
HBA1C MFR BLD HPLC: 5.6 %
HBV SURFACE AG SER QL: NONREACTIVE
HCV AB SER QL: NONREACTIVE
HCV S/CO RATIO: 0.07 S/CO
HGB A MFR BLD: 97.2 %
HGB A2 MFR BLD: 2.8 %
HGB FRACT BLD-IMP: NORMAL
HIV1+2 AB SPEC QL IA.RAPID: NONREACTIVE
KETONES URINE: ABNORMAL MG/DL
LEUKOCYTE ESTERASE URINE: NEGATIVE
NITRITE URINE: NEGATIVE
PH URINE: 6
POTASSIUM SERPL-SCNC: 3.8 MMOL/L
PROT SERPL-MCNC: 7.4 G/DL
PROTEIN URINE: 30 MG/DL
SODIUM SERPL-SCNC: 140 MMOL/L
SPECIFIC GRAVITY URINE: 1.03
T PALLIDUM AB SER QL IA: NEGATIVE
TSH SERPL-ACNC: 2.92 UIU/ML
UROBILINOGEN URINE: 1 MG/DL

## 2023-07-13 LAB
CMV IGG SERPL QL: >10 U/ML
CMV IGG SERPL-IMP: POSITIVE
CMV IGM SERPL QL: <8 AU/ML
CMV IGM SERPL QL: NEGATIVE
MEV IGG FLD QL IA: 43.2 AU/ML
MEV IGG+IGM SER-IMP: POSITIVE
MUV AB SER-ACNC: POSITIVE
MUV IGG SER QL IA: 124 AU/ML
RUBV IGG FLD-ACNC: 14.5 INDEX
RUBV IGG SER-IMP: POSITIVE
T GONDII AB SER-IMP: NEGATIVE
T GONDII AB SER-IMP: NEGATIVE
T GONDII IGG SER QL: <3 IU/ML
T GONDII IGM SER QL: 3.9 AU/ML
VZV AB TITR SER: POSITIVE
VZV IGG SER IF-ACNC: 1938 INDEX

## 2023-07-14 LAB
M TB IFN-G BLD-IMP: NEGATIVE
QUANTIFERON TB PLUS MITOGEN MINUS NIL: 9.48 IU/ML
QUANTIFERON TB PLUS NIL: 0.01 IU/ML
QUANTIFERON TB PLUS TB1 MINUS NIL: 0.06 IU/ML
QUANTIFERON TB PLUS TB2 MINUS NIL: 0.04 IU/ML

## 2023-07-16 LAB
B19V IGG SER QL IA: 6.89 INDEX
B19V IGG+IGM SER-IMP: NORMAL
B19V IGG+IGM SER-IMP: POSITIVE
B19V IGM FLD-ACNC: 0.5 INDEX
B19V IGM SER-ACNC: NEGATIVE

## 2023-07-17 LAB — LEAD BLD-MCNC: <1 UG/DL

## 2023-07-18 ENCOUNTER — ASOB RESULT (OUTPATIENT)
Age: 43
End: 2023-07-18

## 2023-07-18 ENCOUNTER — APPOINTMENT (OUTPATIENT)
Dept: MATERNAL FETAL MEDICINE | Facility: CLINIC | Age: 43
End: 2023-07-18
Payer: MEDICAID

## 2023-07-18 ENCOUNTER — APPOINTMENT (OUTPATIENT)
Dept: ANTEPARTUM | Facility: CLINIC | Age: 43
End: 2023-07-18
Payer: MEDICAID

## 2023-07-18 LAB — FMR1 GENE MUT ANL BLD/T: NORMAL

## 2023-07-18 PROCEDURE — 99202 OFFICE O/P NEW SF 15 MIN: CPT | Mod: TH,95

## 2023-07-18 PROCEDURE — 76813 OB US NUCHAL MEAS 1 GEST: CPT

## 2023-07-19 ENCOUNTER — APPOINTMENT (OUTPATIENT)
Dept: ANTEPARTUM | Facility: CLINIC | Age: 43
End: 2023-07-19

## 2023-07-19 LAB
AR GENE MUT ANL BLD/T: NORMAL
CFTR MUT TESTED BLD/T: NEGATIVE

## 2023-07-21 LAB
ADDITIONAL US: NORMAL
COMMENTS: AFP: NORMAL
CRL SCAN TWIN B: NORMAL
CRL SCAN: NORMAL
CROWN RUMP LENGTH TWIN B: NORMAL
CROWN RUMP LENGTH: 61.1 MM
DOWN SYNDROME AGE RISK: NORMAL
DOWN SYNDROME INTERPRETATION: NORMAL
DOWN SYNDROME SCREENING RISK: NORMAL
GEST. AGE ON COLLECTION DATE: 12.4 WEEKS
HCG MOM: 1.14
HCG VALUE: 93.5 IU/ML
MATERNAL AGE AT EDD: 44.1 YR
NOTE: AFP: NORMAL
NT MOM TWIN B: NORMAL
NT TWIN B: NORMAL
NUCHAL TRANSLUCENCY (NT): 1.6 MM
NUCHAL TRANSLUCENCY MOM: 1.01
NUMBER OF FETUSES: 1
PAPP-A MOM: 0.96
PAPP-A VALUE: 650.9 NG/ML
RACE: NORMAL
RESULTS AFP: NORMAL
SONOGRAPHER ID#: NORMAL
SUBMIT PART 2 SAMPLE USING: NORMAL
TEST RESULTS: AFP: NORMAL
TRISOMY 18 AGE RISK: NORMAL
TRISOMY 18 INTERPRETATION: NORMAL
TRISOMY 18 SCREENING RISK: NORMAL
WEIGHT AFP: 205 LBS

## 2023-07-25 ENCOUNTER — LABORATORY RESULT (OUTPATIENT)
Age: 43
End: 2023-07-25

## 2023-07-26 ENCOUNTER — APPOINTMENT (OUTPATIENT)
Dept: ANTEPARTUM | Facility: CLINIC | Age: 43
End: 2023-07-26
Payer: MEDICAID

## 2023-07-26 PROCEDURE — 36415 COLL VENOUS BLD VENIPUNCTURE: CPT

## 2023-08-04 ENCOUNTER — EMERGENCY (EMERGENCY)
Facility: HOSPITAL | Age: 43
LOS: 1 days | Discharge: HOME | End: 2023-08-04
Attending: STUDENT IN AN ORGANIZED HEALTH CARE EDUCATION/TRAINING PROGRAM
Payer: COMMERCIAL

## 2023-08-04 VITALS
TEMPERATURE: 98 F | RESPIRATION RATE: 20 BRPM | SYSTOLIC BLOOD PRESSURE: 108 MMHG | OXYGEN SATURATION: 99 % | WEIGHT: 212.08 LBS | DIASTOLIC BLOOD PRESSURE: 74 MMHG | HEART RATE: 78 BPM

## 2023-08-04 DIAGNOSIS — Q36.0 CLEFT LIP, BILATERAL: Chronic | ICD-10-CM

## 2023-08-04 DIAGNOSIS — Z13.89 ENCOUNTER FOR SCREENING FOR OTHER DISORDER: ICD-10-CM

## 2023-08-04 PROCEDURE — 99284 EMERGENCY DEPT VISIT MOD MDM: CPT

## 2023-08-04 RX ORDER — METOCLOPRAMIDE HCL 10 MG
10 TABLET ORAL ONCE
Refills: 0 | Status: COMPLETED | OUTPATIENT
Start: 2023-08-04 | End: 2023-08-04

## 2023-08-04 RX ORDER — ACETAMINOPHEN 500 MG
1000 TABLET ORAL ONCE
Refills: 0 | Status: COMPLETED | OUTPATIENT
Start: 2023-08-04 | End: 2023-08-04

## 2023-08-04 RX ORDER — SODIUM CHLORIDE 9 MG/ML
1000 INJECTION INTRAMUSCULAR; INTRAVENOUS; SUBCUTANEOUS ONCE
Refills: 0 | Status: COMPLETED | OUTPATIENT
Start: 2023-08-04 | End: 2023-08-04

## 2023-08-04 RX ADMIN — SODIUM CHLORIDE 1000 MILLILITER(S): 9 INJECTION INTRAMUSCULAR; INTRAVENOUS; SUBCUTANEOUS at 23:46

## 2023-08-04 RX ADMIN — Medication 400 MILLIGRAM(S): at 23:46

## 2023-08-04 RX ADMIN — Medication 104 MILLIGRAM(S): at 23:55

## 2023-08-04 NOTE — ED ADULT NURSE NOTE - NSFALLUNIVINTERV_ED_ALL_ED
Assistance OOB with selected safe patient handling equipment if applicable/Bed/Stretcher in lowest position, wheels locked, appropriate side rails in place/Call bell, personal items and telephone in reach/Instruct patient to call for assistance before getting out of bed/chair/stretcher/Non-slip footwear applied when patient is off stretcher/[place] to call system/Physically safe environment - no spills, clutter or unnecessary equipment/Purposeful proactive rounding/Room/bathroom lighting operational, light cord in reach

## 2023-08-04 NOTE — ED ADULT TRIAGE NOTE - CHIEF COMPLAINT QUOTE
Im 3 month pregnant c/o back pain, lower abdominal pain, vaginal bleeding ( I noticed a drop of blood earlier as state by patient)

## 2023-08-04 NOTE — ED ADULT NURSE NOTE - OBJECTIVE STATEMENT
pt states she is 3 months pregnant and 2 days ago she developed lower back pain that is an 8-9/10. pt denies any trauma to back. pt also c/o a headache every day. pt states today when she went to the bathroom she had a little bit of bloody discharge.

## 2023-08-04 NOTE — ED PROVIDER NOTE - ATTENDING APP SHARED VISIT CONTRIBUTION OF CARE
43y F, currently 15 weeks gestation, presents for abdominal pain and vaginal bleeding. Hemodynamically stable. Labs without acute findings. US showing live IUP. Treated for UTI with rocephin. Rx for keflex sent. Discharged in stable condition.

## 2023-08-04 NOTE — ED PROVIDER NOTE - OBJECTIVE STATEMENT
43 y,o female  LMP on 23 as per pt presents in Er and c.o cramping on the lower abdominal area , small vaginal bleeding , lower back pain x 2 days , she has also headache x 1 month that she did not take any med for the headache and back pain . denies any nausea or vomiting- fever or urinary symptoms. she f.u with Ob in [place] dose not know the name 43 y,o female  LMP on 23 as per pt presents in Er and c.o cramping on the lower abdominal area , small vaginal bleeding , lower back pain x 2 days , she has also headache x 1 month that she did not take any med for the headache and back pain . admit nausea  denies any  vomiting- fever or urinary symptoms. she f.u with Ob in [place] dose not know the name

## 2023-08-04 NOTE — ED PROVIDER NOTE - PHYSICAL EXAMINATION
Const: AOX3 nontoxic appearing, no apparent respiratory or physical distress. Stable gait   HEENT: NC/AT. Moist mucous membranes.  Eyes: MARILIA. EOMI  Neck: Soft and supple. Full ROM without pain.  Cardiac: Regular rate and regular rhythm. +S1/S2. No murmurs. Peripheral pulses 2+ and symmetric. No LE edema.  Resp: Speaking in full sentences. No evidence of respiratory distress. No wheezes, rales or rhonchi. No adventitious breath sounds   Abd: Soft, non-tender, non-distended. Normal bowel sounds in all 4 quadrants. No guarding or rebound.  Back: Spine midline and non-tender. No CVAT.  Skin: No rashes, abrasions or lacerations.  Lymph: No cervical lymphadenopathy.  Neuro: Awake, alert & oriented x 3. Moves all extremities symmetrically.

## 2023-08-04 NOTE — ED PROVIDER NOTE - CLINICAL SUMMARY MEDICAL DECISION MAKING FREE TEXT BOX
43 y,o female  LMP on 23 as per pt presents in Er and c.o cramping on the lower abdominal area , small vaginal bleeding , lower back pain x 2 days , she has also headache x 1 month that she did not take any med for the headache and back pain . denies any nausea or vomiting- fever or urinary symptoms. she f.u with Ob in [place] dose not know the name  will check the labs and hcg , ua and uc , US OB and Iv tylenols and fluids re eval 43 y,o female  LMP on 23 as per pt presents in Er and c.o cramping on the lower abdominal area , small vaginal bleeding , lower back pain x 2 days , she has also headache x 1 month that she did not take any med for the headache and back pain . denies any nausea or vomiting- fever or urinary symptoms. she f.u with Ob in [place] dose not know the name   f.o ectopic - uti or LBP   will check the labs and hcg , ua and uc , US OB and Iv tylenols and fluids re eval

## 2023-08-04 NOTE — ED PROVIDER NOTE - PATIENT PORTAL LINK FT
You can access the FollowMyHealth Patient Portal offered by [facility] by registering at the following website: [email or web address]. By joining [facility]’s FollowMyHealth portal, you will also be able to view your health information using other applications (apps) compatible with our system.

## 2023-08-04 NOTE — ED PROVIDER NOTE - NSFOLLOWUPINSTRUCTIONS_ED_ALL_ED_FT
please call and follow up with your OB GYn in Monday as schedule and Bring the result   take antibiotic       Embarazo e infección urinaria  Pregnancy and Urinary Tract Infection       Tarun infección urinaria (IU) puede ocurrir en cualquier lugar de las vías urinarias. Estas incluyen los riñones, los tubos que conectan los riñones con la vejiga (uréteres), la vejiga y el tubo por el que se elimina la orina del cuerpo (uretra). Estos órganos fabrican, almacenan y eliminan la orina del organismo. El médico puede usar otras palabras para describir la infección. La IU ginette afecta los uréteres y a los riñones (pielonefritis). La IU baja afecta la vejiga (cistitis) y la uretra (uretritis).    La mayoría de las infecciones de las vías urinarias es causada por la presencia de bacterias en la hanna genital, alrededor de la entrada de las vías urinarias (uretra). Estas bacterias proliferan y causan irritación e inflamación de las vías urinarias. Usted tiene más probabilidades de tener tarun IU jean carlos el embarazo porque los cambios físicos y hormonales por los que atraviesa el cuerpo pueden hacer que sea más fácil que las bacterias ingresen en las vías urinarias. El bebé en gestación también hace presión sobre la vejiga y puede afectar el flujo de orina. Es importante reconocer y tratar las IU jean carlos el embarazo debido al riesgo de complicaciones graves tanto para usted carmela para el bebé.    ¿De qué modo me afecta?  Entre los síntomas de tarun IU, se incluyen los siguientes:    Necesidad inmediata (urgente) de orinar.  Micción frecuente o eliminación de pequeñas cantidades de orina con frecuencia.  Ardor o dolor al orinar.  Presencia de marco a en la orina.  Orina con mal olor u olor atípico.  Dificultad para orinar.  Orina turbia.  Dolor en el abdomen o en la parte inferior de la espalda.  Secreción vaginal.    También puede presentar:    Vómitos o disminución del apetito.  Confusión.  Irritabilidad o cansancio.  Fiebre.  Diarrea.    ¿Cómo afecta esto al bebé?  Tarun IU no tratada jean carlos el embarazo podría ocasionar tarun infección renal o tarun infección generalizada, lo que puede causar problemas de nigel que afecten al bebé. Algunas de las complicaciones posibles de tarun IU no tratada son las siguientes:    Tariq a nidhi al bebé antes de las 37 semanas de embarazo (prematuro).  Tener un bebé con bajo peso al nacer.  Presentar presión arterial ginette jean carlos el embarazo (preeclampsia).  Tener un nivel bajo de hemoglobina (anemia).    ¿Qué puedo hacer para disminuir el riesgo?  Para prevenir la IU, jose lo siguiente:    Vaya al baño en cuanto sienta la necesidad de hacerlo. No contenga la orina jean carlos largos períodos de tiempo.  Límpiese siempre desde adelante hacia atrás, en especial después de defecar. Use cada trozo de papel higiénico solo tarun vez cuando se limpie.  Vacíe la vejiga después de tener relaciones sexuales.  Mantenga la hanna genital seca.  Stephanie entre 6 y 10 vasos de agua por día.  No se jose duchas vaginales ni use desodorantes en aerosol.    ¿Cómo se trata?  El tratamiento de esta afección puede incluir lo siguiente:    Antibióticos cuyo uso es seguro jean carlos el embarazo.  Otros medicamentos para tratar causas menos frecuentes de IU.    Siga estas instrucciones en rodriguez casa:  Si le recetaron un antibiótico, tómelo carmela se lo haya indicado el médico. No deje de usar el antibiótico aunque comience a sentirse mejor.  Concurra a todas las visitas de seguimiento carmela se lo haya indicado el médico. [place] es importante.    Comuníquese con un médico si:  Los síntomas no mejoran o empeoran.  Tiene tarun secreción vaginal anormal.    Solicite ayuda inmediatamente si:  Tiene fiebre.  Tiene náuseas y vómitos.  Siente dolor en la espalda o en el costado del cuerpo.  Siente contracciones en el útero.  Siente dolor en la parte inferior del abdomen.  Tiene tarun pérdida de líquido abundante por la vagina.  Observa marco a en la orina.    Resumen  Tarun infección urinaria (IU) es tarun infección en cualquier parte de las vías urinarias, que incluyen los riñones, los uréteres, la vejiga y la uretra.  La mayoría de las infecciones de las vías urinarias es causada por la presencia de bacterias en la hanna genital, alrededor de la entrada de las vías urinarias (uretra).  Es más probable presentar tarun IU jean carlos el embarazo.  Si le recetaron un antibiótico, tómelo carmela se lo haya indicado el médico. No deje de usar el antibiótico aunque comience a sentirse mejor.    NOTAS ADICIONALES E INSTRUCCIONES    Please follow up with your Primary MD in 24-48 hr.  Seek immediate medical care for any new/worsening signs or symptoms.

## 2023-08-05 VITALS
OXYGEN SATURATION: 99 % | DIASTOLIC BLOOD PRESSURE: 62 MMHG | HEART RATE: 59 BPM | SYSTOLIC BLOOD PRESSURE: 102 MMHG | RESPIRATION RATE: 20 BRPM | TEMPERATURE: 98 F

## 2023-08-05 LAB
ALBUMIN SERPL ELPH-MCNC: 3.5 G/DL — SIGNIFICANT CHANGE UP (ref 3.3–5.2)
ALP SERPL-CCNC: 69 U/L — SIGNIFICANT CHANGE UP (ref 40–120)
ALT FLD-CCNC: 7 U/L — SIGNIFICANT CHANGE UP
ANION GAP SERPL CALC-SCNC: 13 MMOL/L — SIGNIFICANT CHANGE UP (ref 5–17)
APPEARANCE UR: ABNORMAL
AST SERPL-CCNC: 12 U/L — SIGNIFICANT CHANGE UP
BACTERIA # UR AUTO: ABNORMAL
BASOPHILS # BLD AUTO: 0.05 K/UL — SIGNIFICANT CHANGE UP (ref 0–0.2)
BASOPHILS NFR BLD AUTO: 0.4 % — SIGNIFICANT CHANGE UP (ref 0–2)
BILIRUB SERPL-MCNC: <0.2 MG/DL — LOW (ref 0.4–2)
BILIRUB UR-MCNC: NEGATIVE — SIGNIFICANT CHANGE UP
BLD GP AB SCN SERPL QL: SIGNIFICANT CHANGE UP
BUN SERPL-MCNC: 6.9 MG/DL — LOW (ref 8–20)
CALCIUM SERPL-MCNC: 8.6 MG/DL — SIGNIFICANT CHANGE UP (ref 8.4–10.5)
CHLORIDE SERPL-SCNC: 103 MMOL/L — SIGNIFICANT CHANGE UP (ref 96–108)
CO2 SERPL-SCNC: 22 MMOL/L — SIGNIFICANT CHANGE UP (ref 22–29)
COLOR SPEC: YELLOW — SIGNIFICANT CHANGE UP
CREAT SERPL-MCNC: 0.55 MG/DL — SIGNIFICANT CHANGE UP (ref 0.5–1.3)
DIFF PNL FLD: ABNORMAL
EGFR: 117 ML/MIN/1.73M2 — SIGNIFICANT CHANGE UP
EOSINOPHIL # BLD AUTO: 0.27 K/UL — SIGNIFICANT CHANGE UP (ref 0–0.5)
EOSINOPHIL NFR BLD AUTO: 2.1 % — SIGNIFICANT CHANGE UP (ref 0–6)
EPI CELLS # UR: ABNORMAL
GLUCOSE SERPL-MCNC: 89 MG/DL — SIGNIFICANT CHANGE UP (ref 70–99)
GLUCOSE UR QL: NEGATIVE MG/DL — SIGNIFICANT CHANGE UP
HCG SERPL-ACNC: HIGH MIU/ML
HCT VFR BLD CALC: 36.2 % — SIGNIFICANT CHANGE UP (ref 34.5–45)
HGB BLD-MCNC: 12.6 G/DL — SIGNIFICANT CHANGE UP (ref 11.5–15.5)
IMM GRANULOCYTES NFR BLD AUTO: 0.4 % — SIGNIFICANT CHANGE UP (ref 0–0.9)
KETONES UR-MCNC: ABNORMAL
LEUKOCYTE ESTERASE UR-ACNC: ABNORMAL
LYMPHOCYTES # BLD AUTO: 26.2 % — SIGNIFICANT CHANGE UP (ref 13–44)
LYMPHOCYTES # BLD AUTO: 3.35 K/UL — HIGH (ref 1–3.3)
MCHC RBC-ENTMCNC: 31.3 PG — SIGNIFICANT CHANGE UP (ref 27–34)
MCHC RBC-ENTMCNC: 34.8 GM/DL — SIGNIFICANT CHANGE UP (ref 32–36)
MCV RBC AUTO: 89.8 FL — SIGNIFICANT CHANGE UP (ref 80–100)
MONOCYTES # BLD AUTO: 0.97 K/UL — HIGH (ref 0–0.9)
MONOCYTES NFR BLD AUTO: 7.6 % — SIGNIFICANT CHANGE UP (ref 2–14)
NEUTROPHILS # BLD AUTO: 8.08 K/UL — HIGH (ref 1.8–7.4)
NEUTROPHILS NFR BLD AUTO: 63.3 % — SIGNIFICANT CHANGE UP (ref 43–77)
NITRITE UR-MCNC: NEGATIVE — SIGNIFICANT CHANGE UP
PH UR: 6 — SIGNIFICANT CHANGE UP (ref 5–8)
PLATELET # BLD AUTO: 257 K/UL — SIGNIFICANT CHANGE UP (ref 150–400)
POTASSIUM SERPL-MCNC: 3.7 MMOL/L — SIGNIFICANT CHANGE UP (ref 3.5–5.3)
POTASSIUM SERPL-SCNC: 3.7 MMOL/L — SIGNIFICANT CHANGE UP (ref 3.5–5.3)
PROT SERPL-MCNC: 6.6 G/DL — SIGNIFICANT CHANGE UP (ref 6.6–8.7)
PROT UR-MCNC: NEGATIVE — SIGNIFICANT CHANGE UP
RBC # BLD: 4.03 M/UL — SIGNIFICANT CHANGE UP (ref 3.8–5.2)
RBC # FLD: 13 % — SIGNIFICANT CHANGE UP (ref 10.3–14.5)
RBC CASTS # UR COMP ASSIST: ABNORMAL /HPF (ref 0–4)
SODIUM SERPL-SCNC: 138 MMOL/L — SIGNIFICANT CHANGE UP (ref 135–145)
SP GR SPEC: 1.02 — SIGNIFICANT CHANGE UP (ref 1.01–1.02)
UROBILINOGEN FLD QL: NEGATIVE MG/DL — SIGNIFICANT CHANGE UP
WBC # BLD: 12.77 K/UL — HIGH (ref 3.8–10.5)
WBC # FLD AUTO: 12.77 K/UL — HIGH (ref 3.8–10.5)
WBC UR QL: ABNORMAL /HPF (ref 0–5)

## 2023-08-05 PROCEDURE — 87086 URINE CULTURE/COLONY COUNT: CPT

## 2023-08-05 PROCEDURE — 76817 TRANSVAGINAL US OBSTETRIC: CPT | Mod: 26

## 2023-08-05 PROCEDURE — 86900 BLOOD TYPING SEROLOGIC ABO: CPT

## 2023-08-05 PROCEDURE — 76815 OB US LIMITED FETUS(S): CPT | Mod: 26

## 2023-08-05 PROCEDURE — 85025 COMPLETE CBC W/AUTO DIFF WBC: CPT

## 2023-08-05 PROCEDURE — 86901 BLOOD TYPING SEROLOGIC RH(D): CPT

## 2023-08-05 PROCEDURE — 76817 TRANSVAGINAL US OBSTETRIC: CPT

## 2023-08-05 PROCEDURE — 36415 COLL VENOUS BLD VENIPUNCTURE: CPT

## 2023-08-05 PROCEDURE — 76815 OB US LIMITED FETUS(S): CPT

## 2023-08-05 PROCEDURE — 99284 EMERGENCY DEPT VISIT MOD MDM: CPT | Mod: 25

## 2023-08-05 PROCEDURE — 81001 URINALYSIS AUTO W/SCOPE: CPT

## 2023-08-05 PROCEDURE — 96375 TX/PRO/DX INJ NEW DRUG ADDON: CPT

## 2023-08-05 PROCEDURE — 96374 THER/PROPH/DIAG INJ IV PUSH: CPT

## 2023-08-05 PROCEDURE — 84702 CHORIONIC GONADOTROPIN TEST: CPT

## 2023-08-05 PROCEDURE — 80053 COMPREHEN METABOLIC PANEL: CPT

## 2023-08-05 PROCEDURE — 86850 RBC ANTIBODY SCREEN: CPT

## 2023-08-05 RX ORDER — CEFTRIAXONE 500 MG/1
1000 INJECTION, POWDER, FOR SOLUTION INTRAMUSCULAR; INTRAVENOUS ONCE
Refills: 0 | Status: DISCONTINUED | OUTPATIENT
Start: 2023-08-05 | End: 2023-08-05

## 2023-08-05 RX ORDER — CEPHALEXIN 500 MG
1 CAPSULE ORAL
Qty: 21 | Refills: 0
Start: 2023-08-05 | End: 2023-08-11

## 2023-08-05 RX ORDER — DOXYLAMINE SUCCINATE AND PYRIDOXINE HYDROCHLORIDE, DELAYED RELEASE TABLETS 10 MG/10 MG 10; 10 MG/1; MG/1
2 TABLET, DELAYED RELEASE ORAL
Qty: 14 | Refills: 0
Start: 2023-08-05 | End: 2023-08-11

## 2023-08-05 RX ORDER — CEFTRIAXONE 500 MG/1
1000 INJECTION, POWDER, FOR SOLUTION INTRAMUSCULAR; INTRAVENOUS ONCE
Refills: 0 | Status: COMPLETED | OUTPATIENT
Start: 2023-08-05 | End: 2023-08-05

## 2023-08-05 RX ADMIN — CEFTRIAXONE 1000 MILLIGRAM(S): 500 INJECTION, POWDER, FOR SOLUTION INTRAMUSCULAR; INTRAVENOUS at 02:45

## 2023-08-07 LAB
CULTURE RESULTS: SIGNIFICANT CHANGE UP
SPECIMEN SOURCE: SIGNIFICANT CHANGE UP

## 2023-08-08 ENCOUNTER — NON-APPOINTMENT (OUTPATIENT)
Age: 43
End: 2023-08-08

## 2023-08-08 ENCOUNTER — APPOINTMENT (OUTPATIENT)
Dept: OBGYN | Facility: CLINIC | Age: 43
End: 2023-08-08
Payer: MEDICAID

## 2023-08-08 VITALS
DIASTOLIC BLOOD PRESSURE: 76 MMHG | SYSTOLIC BLOOD PRESSURE: 118 MMHG | BODY MASS INDEX: 30.81 KG/M2 | HEIGHT: 69 IN | WEIGHT: 208 LBS

## 2023-08-08 LAB
BILIRUB UR QL STRIP: NORMAL
GLUCOSE UR-MCNC: NORMAL
HCG UR QL: 1 EU/DL
HGB UR QL STRIP.AUTO: NORMAL
KETONES UR-MCNC: NORMAL
LEUKOCYTE ESTERASE UR QL STRIP: NORMAL
NITRITE UR QL STRIP: NORMAL
PH UR STRIP: 6.5
PROT UR STRIP-MCNC: 30
SP GR UR STRIP: 1.02

## 2023-08-08 PROCEDURE — 99213 OFFICE O/P EST LOW 20 MIN: CPT | Mod: TH,25

## 2023-08-08 PROCEDURE — 81003 URINALYSIS AUTO W/O SCOPE: CPT | Mod: NC,QW

## 2023-08-10 ENCOUNTER — APPOINTMENT (OUTPATIENT)
Dept: PULMONOLOGY | Facility: CLINIC | Age: 43
End: 2023-08-10

## 2023-08-15 DIAGNOSIS — Z13.71 ENCOUNTER FOR NONPROCREATIVE SCREENING FOR GENETIC DISEASE CARRIER STATUS: ICD-10-CM

## 2023-08-15 DIAGNOSIS — Z34.91 ENCOUNTER FOR SUPERVISION OF NORMAL PREGNANCY, UNSPECIFIED, FIRST TRIMESTER: ICD-10-CM

## 2023-08-15 DIAGNOSIS — Z36.82 ENCOUNTER FOR ANTENATAL SCREENING FOR NUCHAL TRANSLUCENCY: ICD-10-CM

## 2023-08-16 ENCOUNTER — ASOB RESULT (OUTPATIENT)
Age: 43
End: 2023-08-16

## 2023-08-16 ENCOUNTER — APPOINTMENT (OUTPATIENT)
Dept: MATERNAL FETAL MEDICINE | Facility: CLINIC | Age: 43
End: 2023-08-16
Payer: MEDICAID

## 2023-08-16 PROCEDURE — 99212 OFFICE O/P EST SF 10 MIN: CPT | Mod: TH,95

## 2023-08-18 LAB
ADDITIONAL US: NORMAL
AFP MOM: 0.92
AFP VALUE: 21.4 NG/ML
COLLECTED ON 2: NORMAL
COLLECTED ON: NORMAL
CRL SCAN TWIN B: NORMAL
CRL SCAN: NORMAL
CROWN RUMP LENGTH TWIN B: NORMAL
CROWN RUMP LENGTH: 61.1 MM
DIA MOM: 2.04
DIA VALUE: 276.2 PG/ML
DOWN SYNDROME AGE RISK: NORMAL
DOWN SYNDROME INTERPRETATION: NORMAL
DOWN SYNDROME SCREENING RISK: NORMAL
FIRST TRIMESTER SAMPLE: NORMAL
GEST. AGE ON COLLECTION DATE: 12.4 WEEKS
GESTATIONAL AGE: 15.3 WEEKS
HCG MOM: 0.91
HCG VALUE: 33.7 IU/ML
INSULIN DEP DIABETES: NO
MATERNAL AGE AT EDD: 44.1 YR
NT MOM TWIN B: NORMAL
NT TWIN B: NORMAL
NUCHAL TRANSLUCENCY (NT): 1.6 MM
NUCHAL TRANSLUCENCY MOM: 1.01
NUMBER OF FETUSES: 1
OPEN SPINA BIFIDA: NORMAL
OSB INTERPRETATION: NORMAL
PAPP-A MOM: 0.96
PAPP-A VALUE: 650.9 NG/ML
RACE: NORMAL
SECOND TRIMESTER SAMPLE: NORMAL
SEQUENTIAL 2 COMMENTS: NORMAL
SEQUENTIAL 2 NOTE: NORMAL
SEQUENTIAL 2 RESULTS: NORMAL
SEQUENTIAL 2 TEST RESULTS: ABNORMAL
SONOGRAPHER ID#: NORMAL
TRISOMY 18 AGE RISK: NORMAL
TRISOMY 18 INTERPRETATION: NORMAL
TRISOMY 18 SCREENING RISK: NORMAL
UE3 MOM: 0.91
UE3 VALUE: 0.66 NG/ML
WEIGHT AFP: 205 LBS
WEIGHT: 205 LBS

## 2023-08-22 ENCOUNTER — NON-APPOINTMENT (OUTPATIENT)
Age: 43
End: 2023-08-22

## 2023-08-22 ENCOUNTER — APPOINTMENT (OUTPATIENT)
Dept: ANTEPARTUM | Facility: CLINIC | Age: 43
End: 2023-08-22

## 2023-09-08 ENCOUNTER — APPOINTMENT (OUTPATIENT)
Dept: OBGYN | Facility: CLINIC | Age: 43
End: 2023-09-08
Payer: MEDICAID

## 2023-09-08 ENCOUNTER — NON-APPOINTMENT (OUTPATIENT)
Age: 43
End: 2023-09-08

## 2023-09-08 VITALS
SYSTOLIC BLOOD PRESSURE: 100 MMHG | WEIGHT: 215.06 LBS | DIASTOLIC BLOOD PRESSURE: 60 MMHG | BODY MASS INDEX: 31.85 KG/M2 | HEIGHT: 69 IN

## 2023-09-08 LAB
BILIRUB UR QL STRIP: NORMAL
GLUCOSE UR-MCNC: NORMAL
HCG UR QL: 0.2 EU/DL
HGB UR QL STRIP.AUTO: NORMAL
KETONES UR-MCNC: NORMAL
LEUKOCYTE ESTERASE UR QL STRIP: NORMAL
NITRITE UR QL STRIP: NORMAL
PH UR STRIP: 6
PROT UR STRIP-MCNC: NORMAL
SP GR UR STRIP: 1.02

## 2023-09-08 PROCEDURE — 99213 OFFICE O/P EST LOW 20 MIN: CPT | Mod: TH,25

## 2023-09-08 PROCEDURE — 81003 URINALYSIS AUTO W/O SCOPE: CPT | Mod: QW

## 2023-09-12 ENCOUNTER — ASOB RESULT (OUTPATIENT)
Age: 43
End: 2023-09-12

## 2023-09-12 ENCOUNTER — APPOINTMENT (OUTPATIENT)
Dept: ANTEPARTUM | Facility: CLINIC | Age: 43
End: 2023-09-12
Payer: MEDICAID

## 2023-09-12 PROCEDURE — 76811 OB US DETAILED SNGL FETUS: CPT

## 2023-09-12 PROCEDURE — 76817 TRANSVAGINAL US OBSTETRIC: CPT | Mod: 59

## 2023-09-23 ENCOUNTER — OUTPATIENT (OUTPATIENT)
Dept: INPATIENT UNIT | Facility: HOSPITAL | Age: 43
LOS: 1 days | End: 2023-09-23
Payer: COMMERCIAL

## 2023-09-23 VITALS — SYSTOLIC BLOOD PRESSURE: 107 MMHG | DIASTOLIC BLOOD PRESSURE: 57 MMHG | HEART RATE: 72 BPM

## 2023-09-23 VITALS
RESPIRATION RATE: 17 BRPM | TEMPERATURE: 98 F | HEART RATE: 78 BPM | SYSTOLIC BLOOD PRESSURE: 106 MMHG | DIASTOLIC BLOOD PRESSURE: 57 MMHG

## 2023-09-23 DIAGNOSIS — O26.899 OTHER SPECIFIED PREGNANCY RELATED CONDITIONS, UNSPECIFIED TRIMESTER: ICD-10-CM

## 2023-09-23 DIAGNOSIS — Q36.0 CLEFT LIP, BILATERAL: Chronic | ICD-10-CM

## 2023-09-23 LAB
APPEARANCE UR: CLEAR — SIGNIFICANT CHANGE UP
BACTERIA # UR AUTO: ABNORMAL
BILIRUB UR-MCNC: NEGATIVE — SIGNIFICANT CHANGE UP
COLOR SPEC: YELLOW — SIGNIFICANT CHANGE UP
DIFF PNL FLD: ABNORMAL
EPI CELLS # UR: SIGNIFICANT CHANGE UP
GLUCOSE UR QL: NEGATIVE MG/DL — SIGNIFICANT CHANGE UP
KETONES UR-MCNC: ABNORMAL
LEUKOCYTE ESTERASE UR-ACNC: ABNORMAL
NITRITE UR-MCNC: NEGATIVE — SIGNIFICANT CHANGE UP
PH UR: 6 — SIGNIFICANT CHANGE UP (ref 5–8)
PROT UR-MCNC: SIGNIFICANT CHANGE UP MG/DL
RBC CASTS # UR COMP ASSIST: ABNORMAL /HPF (ref 0–4)
SP GR SPEC: 1.02 — SIGNIFICANT CHANGE UP (ref 1.01–1.02)
UROBILINOGEN FLD QL: 1 MG/DL
WBC UR QL: ABNORMAL /HPF (ref 0–5)

## 2023-09-23 PROCEDURE — 87800 DETECT AGNT MULT DNA DIREC: CPT

## 2023-09-23 PROCEDURE — 36415 COLL VENOUS BLD VENIPUNCTURE: CPT

## 2023-09-23 PROCEDURE — 84112 EVAL AMNIOTIC FLUID PROTEIN: CPT

## 2023-09-23 PROCEDURE — 59025 FETAL NON-STRESS TEST: CPT

## 2023-09-23 PROCEDURE — 81001 URINALYSIS AUTO W/SCOPE: CPT

## 2023-09-23 PROCEDURE — 83986 ASSAY PH BODY FLUID NOS: CPT

## 2023-09-23 PROCEDURE — G0463: CPT

## 2023-09-23 NOTE — OB PROVIDER TRIAGE NOTE - NSHPPHYSICALEXAM_GEN_ALL_CORE
HR: 72 (09-23-23 @ 22:44) (72 - 78)  BP: 107/57 (09-23-23 @ 22:44) (106/57 - 107/57)  RR: 17 (09-23-23 @ 21:13) (17 - 17)    Gen: NAD, well-appearing  Abd: soft, gravid, non tender  Ext: non-edematous, non-tender   SVE: deferred  SSE: cervix visualized, closed and without any signs of bleeding or drainage, no pooling. Clumpy white discharge   FHT: Intermittent dopplers 135-140bpm  [place]: irregular ctx    Bedside sono IFTIKHAR 22, robust fetal movement, . Anterior placenta.  Nitrazine neg, Amnisure neg. No pooling seen

## 2023-09-23 NOTE — OB PROVIDER TRIAGE NOTE - ATTENDING COMMENTS
Addendum:    Subjective Hx, Physical Exam, Laboratory & Imaging results reviewed.  I agree with the Resident Physician's assessment and plan of care, as discussed above.  NST reactive. Call, follow up, and return to Hospital parameters reviewed at length.  She was given the opportunity to ask questions and all were addressed. Discharge home.    Maximino Boston MD (OB Hospitalist On-Call)

## 2023-09-23 NOTE — OB PROVIDER TRIAGE NOTE - NSOBPROVIDERNOTE_OBGYN_ALL_OB_FT
A/P: 44 yo  at 22w3d, presenting with 1 episode of leakage of fluid today around 9pm.     Fetus: reassuring on sono. FHR wnl  [place]: irregular ctx noted. Pt denies abd cramping/ctx  -R/o PPROM: unlikely at this time. Nitrazine neg, Amnisure neg. negative pooling.   -UA neg  -Yeast infection likely--Affirm sent, empiric treatment with Terazol sent to pharmacy   Dispo: D/c home with PTL precautions    Discussed with Dr. Boston

## 2023-09-23 NOTE — OB RN TRIAGE NOTE - NS_DCDISPOSITION_OBGYN_ALL_OB
Minoxidil Counseling: Minoxidil is a topical medication which can increase blood flow where it is applied. It is uncertain how this medication increases hair growth. Side effects are uncommon and include stinging and allergic reactions. Home

## 2023-09-23 NOTE — OB PROVIDER TRIAGE NOTE - HISTORY OF PRESENT ILLNESS
GREGORIO NAVARRETE is a 43y  at 22w3d GA who presents to L&D for leakage of fluid. At around 2100 tonight the patient was bending down in her bathroom to  her dog and felt a gush of fluid. The fluid was odorless and lightly yellow. She denies further leaking. Endorses increased mucous discharge today. Denies vaginal itching or irregular discharge    Pt denies vaginal bleeding and contractions. She endorses good fetal movement.   No recent sexual intercourse  She denies any urinary complaints.   She denies fevers, chills, nausea, vomiting.     Pregnancy course is significant for:  AMA    POB: 2004  c/b cleft palate, 2/15/2007  uncomplicated. SAB x3. ectopic x1  PGYN: -fibroids/-cysts, denied STD hx, denies abnormal PAPs  PMH: maternal cleft palate, depression/anxiety, abnormal pulmonary CT finding of unclear origin (has seen pulm in the past)  PSH: breast cyst excision . , cleft palate repair  SH: Denies tobacco use, EtOH use and illicit drug use during the pregnancy; Feels safe at home  Meds: PNV  All: NKDA

## 2023-09-23 NOTE — OB RN TRIAGE NOTE - PAIN SCALE PREFERRED, PROFILE
Wound care consult acknowledged. Attempted to see pt but he is having cardiac cath. Pt follows with Dr Barroso at [facility] and was last seen 9/27/21. Wound care service will attempt to see pt later today or tomorrow as schedule allows    HAIM Boston  [facility]  Inpatient Wound Care pager [phone]     none

## 2023-09-23 NOTE — OB RN TRIAGE NOTE - FALL HARM RISK - UNIVERSAL INTERVENTIONS
Bed in lowest position, wheels locked, appropriate side rails in place/Call bell, personal items and telephone in reach/Instruct patient to call for assistance before getting out of bed or chair/Non-slip footwear when patient is out of bed/[place] to call system/Physically safe environment - no spills, clutter or unnecessary equipment/Purposeful Proactive Rounding/Room/bathroom lighting operational, light cord in reach

## 2023-09-23 NOTE — OB RN TRIAGE NOTE - NSICDXPASTMEDICALHX_GEN_ALL_CORE_FT
PAST MEDICAL HISTORY:  Ectopic pregnancy     No pertinent past medical history      (normal spontaneous vaginal delivery)     Spontaneous miscarriage

## 2023-09-24 LAB
CANDIDA AB TITR SER: DETECTED
G VAGINALIS DNA SPEC QL NAA+PROBE: SIGNIFICANT CHANGE UP
T VAGINALIS SPEC QL WET PREP: SIGNIFICANT CHANGE UP

## 2023-09-25 DIAGNOSIS — F41.8 OTHER SPECIFIED ANXIETY DISORDERS: ICD-10-CM

## 2023-09-25 DIAGNOSIS — Z3A.22 22 WEEKS GESTATION OF PREGNANCY: ICD-10-CM

## 2023-09-25 DIAGNOSIS — Z03.71 ENCOUNTER FOR SUSPECTED PROBLEM WITH AMNIOTIC CAVITY AND MEMBRANE RULED OUT: ICD-10-CM

## 2023-09-25 DIAGNOSIS — O09.292 SUPERVISION OF PREGNANCY WITH OTHER POOR REPRODUCTIVE OR OBSTETRIC HISTORY, SECOND TRIMESTER: ICD-10-CM

## 2023-09-25 DIAGNOSIS — O99.342 OTHER MENTAL DISORDERS COMPLICATING PREGNANCY, SECOND TRIMESTER: ICD-10-CM

## 2023-09-25 DIAGNOSIS — O09.12 SUPERVISION OF PREGNANCY WITH HISTORY OF ECTOPIC PREGNANCY, SECOND TRIMESTER: ICD-10-CM

## 2023-09-25 DIAGNOSIS — O09.522 SUPERVISION OF ELDERLY MULTIGRAVIDA, SECOND TRIMESTER: ICD-10-CM

## 2023-09-25 NOTE — CHART NOTE - NSCHARTNOTEFT_GEN_A_CORE
Attempted to call pt x2 today for Affirm results with no answer. No answering .   Pt given empiric yeast infection tx upon d/c 2 days ago.

## 2023-09-26 ENCOUNTER — APPOINTMENT (OUTPATIENT)
Dept: ANTEPARTUM | Facility: CLINIC | Age: 43
End: 2023-09-26

## 2023-09-27 PROBLEM — O03.9 COMPLETE OR UNSPECIFIED SPONTANEOUS ABORTION WITHOUT COMPLICATION: Chronic | Status: ACTIVE | Noted: 2023-09-23

## 2023-09-27 PROBLEM — O00.90 UNSPECIFIED ECTOPIC PREGNANCY WITHOUT INTRAUTERINE PREGNANCY: Chronic | Status: ACTIVE | Noted: 2023-09-23

## 2023-09-29 ENCOUNTER — ASOB RESULT (OUTPATIENT)
Age: 43
End: 2023-09-29

## 2023-09-29 ENCOUNTER — APPOINTMENT (OUTPATIENT)
Dept: ANTEPARTUM | Facility: CLINIC | Age: 43
End: 2023-09-29
Payer: MEDICAID

## 2023-09-29 PROCEDURE — 76816 OB US FOLLOW-UP PER FETUS: CPT

## 2023-10-03 DIAGNOSIS — Z34.92 ENCOUNTER FOR SUPERVISION OF NORMAL PREGNANCY, UNSPECIFIED, SECOND TRIMESTER: ICD-10-CM

## 2023-10-11 ENCOUNTER — NON-APPOINTMENT (OUTPATIENT)
Age: 43
End: 2023-10-11

## 2023-10-23 ENCOUNTER — NON-APPOINTMENT (OUTPATIENT)
Age: 43
End: 2023-10-23

## 2023-10-30 ENCOUNTER — APPOINTMENT (OUTPATIENT)
Dept: OBGYN | Facility: CLINIC | Age: 43
End: 2023-10-30
Payer: MEDICAID

## 2023-10-30 VITALS
WEIGHT: 214.9 LBS | HEIGHT: 69 IN | BODY MASS INDEX: 31.83 KG/M2 | SYSTOLIC BLOOD PRESSURE: 122 MMHG | DIASTOLIC BLOOD PRESSURE: 80 MMHG

## 2023-10-30 LAB
BILIRUB UR QL STRIP: NORMAL
CLARITY UR: CLEAR
COLLECTION METHOD: NORMAL
GLUCOSE UR-MCNC: NORMAL
HCG UR QL: 0.2 EU/DL
HGB UR QL STRIP.AUTO: NORMAL
KETONES UR-MCNC: NORMAL
LEUKOCYTE ESTERASE UR QL STRIP: NORMAL
NITRITE UR QL STRIP: NORMAL
PH UR STRIP: 7
PROT UR STRIP-MCNC: 30
SP GR UR STRIP: 1.03

## 2023-10-30 PROCEDURE — 99213 OFFICE O/P EST LOW 20 MIN: CPT | Mod: TH,25

## 2023-10-30 PROCEDURE — 81003 URINALYSIS AUTO W/O SCOPE: CPT | Mod: QW

## 2023-11-03 ENCOUNTER — APPOINTMENT (OUTPATIENT)
Dept: OBGYN | Facility: CLINIC | Age: 43
End: 2023-11-03

## 2023-11-04 LAB
GLUCOSE 1H P 50 G GLC PO SERPL-MCNC: 82 MG/DL
HCT VFR BLD CALC: 39.6 %
HGB BLD-MCNC: 13.1 G/DL
MCHC RBC-ENTMCNC: 30 PG
MCHC RBC-ENTMCNC: 33.1 GM/DL
MCV RBC AUTO: 90.8 FL
PLATELET # BLD AUTO: 273 K/UL
RBC # BLD: 4.36 M/UL
RBC # FLD: 13.8 %
WBC # FLD AUTO: 13.62 K/UL

## 2023-11-06 ENCOUNTER — APPOINTMENT (OUTPATIENT)
Dept: ANTEPARTUM | Facility: CLINIC | Age: 43
End: 2023-11-06
Payer: MEDICAID

## 2023-11-06 ENCOUNTER — ASOB RESULT (OUTPATIENT)
Age: 43
End: 2023-11-06

## 2023-11-06 PROCEDURE — 76819 FETAL BIOPHYS PROFIL W/O NST: CPT

## 2023-11-06 PROCEDURE — 76817 TRANSVAGINAL US OBSTETRIC: CPT

## 2023-11-06 PROCEDURE — 76816 OB US FOLLOW-UP PER FETUS: CPT

## 2023-11-13 ENCOUNTER — OUTPATIENT (OUTPATIENT)
Dept: INPATIENT UNIT | Facility: HOSPITAL | Age: 43
LOS: 1 days | End: 2023-11-13
Payer: COMMERCIAL

## 2023-11-13 VITALS
SYSTOLIC BLOOD PRESSURE: 104 MMHG | HEART RATE: 83 BPM | RESPIRATION RATE: 18 BRPM | DIASTOLIC BLOOD PRESSURE: 71 MMHG | TEMPERATURE: 97 F

## 2023-11-13 VITALS
HEART RATE: 85 BPM | SYSTOLIC BLOOD PRESSURE: 123 MMHG | DIASTOLIC BLOOD PRESSURE: 71 MMHG | TEMPERATURE: 98 F | RESPIRATION RATE: 17 BRPM

## 2023-11-13 DIAGNOSIS — O26.899 OTHER SPECIFIED PREGNANCY RELATED CONDITIONS, UNSPECIFIED TRIMESTER: ICD-10-CM

## 2023-11-13 DIAGNOSIS — Q36.0 CLEFT LIP, BILATERAL: Chronic | ICD-10-CM

## 2023-11-13 LAB
APPEARANCE UR: ABNORMAL
APPEARANCE UR: ABNORMAL
BACTERIA # UR AUTO: ABNORMAL /HPF
BACTERIA # UR AUTO: ABNORMAL /HPF
BILIRUB UR-MCNC: NEGATIVE — SIGNIFICANT CHANGE UP
BILIRUB UR-MCNC: NEGATIVE — SIGNIFICANT CHANGE UP
COLOR SPEC: YELLOW — SIGNIFICANT CHANGE UP
COLOR SPEC: YELLOW — SIGNIFICANT CHANGE UP
DIFF PNL FLD: NEGATIVE — SIGNIFICANT CHANGE UP
DIFF PNL FLD: NEGATIVE — SIGNIFICANT CHANGE UP
GLUCOSE UR QL: NEGATIVE MG/DL — SIGNIFICANT CHANGE UP
GLUCOSE UR QL: NEGATIVE MG/DL — SIGNIFICANT CHANGE UP
KETONES UR-MCNC: ABNORMAL MG/DL
KETONES UR-MCNC: ABNORMAL MG/DL
LEUKOCYTE ESTERASE UR-ACNC: ABNORMAL
LEUKOCYTE ESTERASE UR-ACNC: ABNORMAL
NITRITE UR-MCNC: NEGATIVE — SIGNIFICANT CHANGE UP
NITRITE UR-MCNC: NEGATIVE — SIGNIFICANT CHANGE UP
PH UR: 6 — SIGNIFICANT CHANGE UP (ref 5–8)
PH UR: 6 — SIGNIFICANT CHANGE UP (ref 5–8)
PROT UR-MCNC: 30 MG/DL
PROT UR-MCNC: 30 MG/DL
RBC CASTS # UR COMP ASSIST: 1 /HPF — SIGNIFICANT CHANGE UP (ref 0–4)
RBC CASTS # UR COMP ASSIST: 1 /HPF — SIGNIFICANT CHANGE UP (ref 0–4)
SP GR SPEC: 1.03 — SIGNIFICANT CHANGE UP (ref 1–1.03)
SP GR SPEC: 1.03 — SIGNIFICANT CHANGE UP (ref 1–1.03)
SQUAMOUS # UR AUTO: 20 /HPF — HIGH (ref 0–5)
SQUAMOUS # UR AUTO: 20 /HPF — HIGH (ref 0–5)
UROBILINOGEN FLD QL: 1 MG/DL — SIGNIFICANT CHANGE UP (ref 0.2–1)
UROBILINOGEN FLD QL: 1 MG/DL — SIGNIFICANT CHANGE UP (ref 0.2–1)
WBC UR QL: 46 /HPF — HIGH (ref 0–5)
WBC UR QL: 46 /HPF — HIGH (ref 0–5)

## 2023-11-13 PROCEDURE — 87800 DETECT AGNT MULT DNA DIREC: CPT

## 2023-11-13 PROCEDURE — 99213 OFFICE O/P EST LOW 20 MIN: CPT | Mod: TH,25,GC

## 2023-11-13 PROCEDURE — 81001 URINALYSIS AUTO W/SCOPE: CPT

## 2023-11-13 PROCEDURE — 87491 CHLMYD TRACH DNA AMP PROBE: CPT

## 2023-11-13 PROCEDURE — 36415 COLL VENOUS BLD VENIPUNCTURE: CPT

## 2023-11-13 PROCEDURE — G0463: CPT

## 2023-11-13 PROCEDURE — 87591 N.GONORRHOEAE DNA AMP PROB: CPT

## 2023-11-13 PROCEDURE — 59025 FETAL NON-STRESS TEST: CPT | Mod: 26

## 2023-11-13 PROCEDURE — 59025 FETAL NON-STRESS TEST: CPT

## 2023-11-13 RX ORDER — SODIUM CHLORIDE 9 MG/ML
1000 INJECTION, SOLUTION INTRAVENOUS
Refills: 0 | Status: DISCONTINUED | OUTPATIENT
Start: 2023-11-13 | End: 2023-11-28

## 2023-11-13 NOTE — OB PROVIDER TRIAGE NOTE - NSWIC_OBGYN_ALL_OB
"GYN ANNUAL EXAM    SUBJECTIVE    Taryn Baig is a 37 y.o. female who presents for annual exam today.   She is using combination OCPs for contraception and is happy with this.  She sometimes takes her placebo pills and has a cycle and sometimes skips her placebos. She has no complaints today.      PMH - none    PSH - none    OB history -   OB History    Para Term  AB Living   0 0 0 0 0 0   SAB IAB Ectopic Multiple Live Births   0 0 0 0 0       Last pap -   Normal HPV Negative-      Last mammogram - never had one - not indicated     Family history of breast or ovarian cancer - none     OBJECTIVE  /64   Ht 1.803 m (5' 11\")   Wt 63.6 kg (140 lb 4 oz)   LMP 10/25/2023 (Exact Date)   Breastfeeding No   BMI 19.56 kg/m²     General Appearance   - consistent with stated age, well groomed and cooperative    Integumentary  - skin warm and dry without rash    Head and Neck  - normalocephalic and neck supple    Chest and Lung Exam  - normal breathing effort, no respiratory distress    Breast  - symmetry noted, no mass palpable, no skin change and no nipple discharge.    Abdomen  - soft, nontender and no hepatomegaly, splenomegaly, or mass    Female Genitourinary  - vulva normal without rash or lesion, normal vaginal rugae, no vaginal discharge, uterus normal size & no palpable masses, no adnexal mass, no adnexal tenderness, no cervical motion tenderness    Peripheral Vascular  - no edema present    ASSESSMENT/PLAN  37 y.o. G0 female who presents for annual exam.       Actions performed during this visit include:  - Clinical breast exam  - Clinical pelvic exam  - Pap- screening pap/HPV sent today   - Contraception - uses combination OCPs and is happy with this - refill sent   - STI screening  - declines     Please return for your next visit in 1 year or sooner as needed.     Ofelia Castillo MD  " No

## 2023-11-13 NOTE — OB PROVIDER TRIAGE NOTE - HISTORY OF PRESENT ILLNESS
GREGORIO NAVARRETE is a 43y  at 29w4d weeks GA who presents to L&D for vaginal pain and yellow discharge.     Pt denies vaginal bleeding, contractions and leakage of fluid. She endorses good fetal movement.   Pt denies trauma.   Pt denies headaches, visual disturbances, RUQ pain, epigastric pain and new-onset edema.   She denies any urinary complaints.   She denies fevers, chills, nausea, vomiting.   She denies shortness of breath, chest pain, and palpitations.    Pregnancy course is significant for:  AMA    POB:  x2, SAB x3  PGYN: -fibroids/-cysts, denied STD hx, denies abnormal PAPs  PMH: Denies  PSH: R breast biopsy (negative)  SH: Denies tobacco use, EtOH use and illicit drug use during the pregnancy; Feels safe at home  Meds: PNV  All: NKDA

## 2023-11-13 NOTE — OB PROVIDER TRIAGE NOTE - ATTENDING COMMENTS
43y  at 29w4d weeks GA who presents to L&D for vaginal pain and yellow discharge but with no s/s  labor, vaginitis, UTI, and reassuring fetal testing. Discharge to home with precautions, follow up for Affirm.  Impression NST: reactive

## 2023-11-13 NOTE — OB PROVIDER TRIAGE NOTE - NSOBPROVIDERNOTE_OBGYN_ALL_OB_FT
A/P: 43y  at 29w4d weeks GA who presents to L&D for vaginal pain and yellow discharge.     Fetus: Cat 1 tracing  [place]: No contractions  BV, GC/CT sent  UA negative for nitrites  Will follow up with results  Dispo: Home with  labor precautions    Discussed with Dr. Mendoza

## 2023-11-13 NOTE — OB PROVIDER TRIAGE NOTE - NSHPPHYSICALEXAM_GEN_ALL_CORE
T(C): 36.3 (11-13-23 @ 18:14), Max: 36.3 (11-13-23 @ 18:11)  HR: 83 (11-13-23 @ 18:19) (83 - 83)  BP: 104/71 (11-13-23 @ 18:19) (104/71 - 104/71)  RR: 18 (11-13-23 @ 18:14) (18 - 18)  SpO2: --  Gen: NAD, well-appearing  Heart: S1 S2, RRR  Lungs: CTAB  Abd: soft, gravid  Ext: non-edematous, non-tender     SSE: cervix visualized, closed and without any signs of bleeding or drainage, no pooling; yellow discharge noted on exam  FHT: Baseline 130, moderate variability, + accels, -decels  [place]: No contractions

## 2023-11-13 NOTE — OB RN TRIAGE NOTE - NS_PARA_OBGYN_ALL_OB_NU
Attempted to contact patient, no answer.     ----- Message from Kera Gutierrez sent at 11/17/2020  9:32 AM CST -----  Regarding: Pt advice/Refill follow up  Contact: Pt  Type:  Patient Returning Call    Who Called:  pt daughter Wendy  Does the patient know what this is regarding?:  please follow up with pt stating he was taken off of Predinisone and it helps with his breathing and would like office to reach out to him  Best Call Back Number:    Additional Information:  Thank you         2

## 2023-11-14 LAB
C TRACH RRNA SPEC QL NAA+PROBE: SIGNIFICANT CHANGE UP
C TRACH RRNA SPEC QL NAA+PROBE: SIGNIFICANT CHANGE UP
N GONORRHOEA RRNA SPEC QL NAA+PROBE: SIGNIFICANT CHANGE UP
N GONORRHOEA RRNA SPEC QL NAA+PROBE: SIGNIFICANT CHANGE UP
SPECIMEN SOURCE: SIGNIFICANT CHANGE UP
SPECIMEN SOURCE: SIGNIFICANT CHANGE UP

## 2023-11-15 DIAGNOSIS — O09.523 SUPERVISION OF ELDERLY MULTIGRAVIDA, THIRD TRIMESTER: ICD-10-CM

## 2023-11-15 DIAGNOSIS — O09.293 SUPERVISION OF PREGNANCY WITH OTHER POOR REPRODUCTIVE OR OBSTETRIC HISTORY, THIRD TRIMESTER: ICD-10-CM

## 2023-11-15 DIAGNOSIS — R10.2 PELVIC AND PERINEAL PAIN: ICD-10-CM

## 2023-11-15 DIAGNOSIS — N89.8 OTHER SPECIFIED NONINFLAMMATORY DISORDERS OF VAGINA: ICD-10-CM

## 2023-11-15 DIAGNOSIS — O26.893 OTHER SPECIFIED PREGNANCY RELATED CONDITIONS, THIRD TRIMESTER: ICD-10-CM

## 2023-11-15 DIAGNOSIS — Z3A.29 29 WEEKS GESTATION OF PREGNANCY: ICD-10-CM

## 2023-11-15 DIAGNOSIS — O99.891 OTHER SPECIFIED DISEASES AND CONDITIONS COMPLICATING PREGNANCY: ICD-10-CM

## 2023-11-15 LAB
CANDIDA AB TITR SER: SIGNIFICANT CHANGE UP
CANDIDA AB TITR SER: SIGNIFICANT CHANGE UP
G VAGINALIS DNA SPEC QL NAA+PROBE: DETECTED
G VAGINALIS DNA SPEC QL NAA+PROBE: DETECTED
T VAGINALIS SPEC QL WET PREP: SIGNIFICANT CHANGE UP
T VAGINALIS SPEC QL WET PREP: SIGNIFICANT CHANGE UP

## 2023-11-16 ENCOUNTER — APPOINTMENT (OUTPATIENT)
Dept: OBGYN | Facility: CLINIC | Age: 43
End: 2023-11-16
Payer: MEDICAID

## 2023-11-16 VITALS
SYSTOLIC BLOOD PRESSURE: 101 MMHG | BODY MASS INDEX: 31.99 KG/M2 | DIASTOLIC BLOOD PRESSURE: 65 MMHG | HEIGHT: 69 IN | WEIGHT: 216 LBS

## 2023-11-16 LAB
BILIRUB UR QL STRIP: ABNORMAL
GLUCOSE UR-MCNC: NORMAL
HCG UR QL: 1 EU/DL
HGB UR QL STRIP.AUTO: NORMAL
KETONES UR-MCNC: ABNORMAL
LEUKOCYTE ESTERASE UR QL STRIP: ABNORMAL
NITRITE UR QL STRIP: NORMAL
PH UR STRIP: 5.5
PROT UR STRIP-MCNC: 100
SP GR UR STRIP: 1.03

## 2023-11-16 PROCEDURE — 99213 OFFICE O/P EST LOW 20 MIN: CPT | Mod: TH,25

## 2023-11-16 PROCEDURE — 81003 URINALYSIS AUTO W/O SCOPE: CPT | Mod: QW

## 2023-11-30 ENCOUNTER — APPOINTMENT (OUTPATIENT)
Dept: OBGYN | Facility: CLINIC | Age: 43
End: 2023-11-30
Payer: MEDICAID

## 2023-11-30 VITALS
WEIGHT: 220 LBS | BODY MASS INDEX: 32.58 KG/M2 | DIASTOLIC BLOOD PRESSURE: 76 MMHG | HEIGHT: 69 IN | SYSTOLIC BLOOD PRESSURE: 110 MMHG

## 2023-11-30 LAB
BILIRUB UR QL STRIP: NORMAL
GLUCOSE UR-MCNC: NORMAL
HCG UR QL: 0.2 EU/DL
HGB UR QL STRIP.AUTO: ABNORMAL
KETONES UR-MCNC: ABNORMAL
LEUKOCYTE ESTERASE UR QL STRIP: ABNORMAL
NITRITE UR QL STRIP: NORMAL
PH UR STRIP: 6
PROT UR STRIP-MCNC: 30
SP GR UR STRIP: 1.03

## 2023-11-30 PROCEDURE — 81003 URINALYSIS AUTO W/O SCOPE: CPT | Mod: QW

## 2023-11-30 PROCEDURE — 99213 OFFICE O/P EST LOW 20 MIN: CPT | Mod: TH,25

## 2023-12-06 ENCOUNTER — ASOB RESULT (OUTPATIENT)
Age: 43
End: 2023-12-06

## 2023-12-06 ENCOUNTER — APPOINTMENT (OUTPATIENT)
Dept: ANTEPARTUM | Facility: CLINIC | Age: 43
End: 2023-12-06
Payer: MEDICAID

## 2023-12-06 PROCEDURE — 76819 FETAL BIOPHYS PROFIL W/O NST: CPT

## 2023-12-06 PROCEDURE — 76816 OB US FOLLOW-UP PER FETUS: CPT

## 2023-12-07 ENCOUNTER — APPOINTMENT (OUTPATIENT)
Dept: OBGYN | Facility: CLINIC | Age: 43
End: 2023-12-07
Payer: MEDICAID

## 2023-12-07 VITALS
BODY MASS INDEX: 32.22 KG/M2 | HEIGHT: 69 IN | WEIGHT: 217.56 LBS | SYSTOLIC BLOOD PRESSURE: 118 MMHG | DIASTOLIC BLOOD PRESSURE: 70 MMHG

## 2023-12-07 LAB
BILIRUB UR QL STRIP: NORMAL
GLUCOSE UR-MCNC: NORMAL
HCG UR QL: 0.2 EU/DL
HGB UR QL STRIP.AUTO: NORMAL
KETONES UR-MCNC: NORMAL
LEUKOCYTE ESTERASE UR QL STRIP: ABNORMAL
NITRITE UR QL STRIP: NORMAL
PH UR STRIP: 6
PROT UR STRIP-MCNC: 30
SP GR UR STRIP: 1.03

## 2023-12-07 PROCEDURE — 99213 OFFICE O/P EST LOW 20 MIN: CPT | Mod: TH,25

## 2023-12-07 PROCEDURE — 81003 URINALYSIS AUTO W/O SCOPE: CPT | Mod: QW

## 2023-12-13 ENCOUNTER — APPOINTMENT (OUTPATIENT)
Dept: ANTEPARTUM | Facility: CLINIC | Age: 43
End: 2023-12-13
Payer: MEDICAID

## 2023-12-13 ENCOUNTER — ASOB RESULT (OUTPATIENT)
Age: 43
End: 2023-12-13

## 2023-12-13 PROCEDURE — 76819 FETAL BIOPHYS PROFIL W/O NST: CPT

## 2023-12-20 ENCOUNTER — APPOINTMENT (OUTPATIENT)
Dept: ANTEPARTUM | Facility: CLINIC | Age: 43
End: 2023-12-20

## 2023-12-21 ENCOUNTER — APPOINTMENT (OUTPATIENT)
Dept: OBGYN | Facility: CLINIC | Age: 43
End: 2023-12-21

## 2023-12-27 ENCOUNTER — NON-APPOINTMENT (OUTPATIENT)
Age: 43
End: 2023-12-27

## 2023-12-27 ENCOUNTER — OUTPATIENT (OUTPATIENT)
Dept: INPATIENT UNIT | Facility: HOSPITAL | Age: 43
LOS: 1 days | End: 2023-12-27
Payer: COMMERCIAL

## 2023-12-27 ENCOUNTER — APPOINTMENT (OUTPATIENT)
Dept: ANTEPARTUM | Facility: CLINIC | Age: 43
End: 2023-12-27

## 2023-12-27 VITALS
SYSTOLIC BLOOD PRESSURE: 105 MMHG | TEMPERATURE: 98 F | RESPIRATION RATE: 16 BRPM | DIASTOLIC BLOOD PRESSURE: 67 MMHG | HEART RATE: 91 BPM

## 2023-12-27 VITALS — DIASTOLIC BLOOD PRESSURE: 55 MMHG | SYSTOLIC BLOOD PRESSURE: 100 MMHG | HEART RATE: 72 BPM

## 2023-12-27 DIAGNOSIS — Q36.0 CLEFT LIP, BILATERAL: Chronic | ICD-10-CM

## 2023-12-27 DIAGNOSIS — O26.899 OTHER SPECIFIED PREGNANCY RELATED CONDITIONS, UNSPECIFIED TRIMESTER: ICD-10-CM

## 2023-12-27 PROCEDURE — 87491 CHLMYD TRACH DNA AMP PROBE: CPT

## 2023-12-27 PROCEDURE — G0463: CPT

## 2023-12-27 PROCEDURE — 83986 ASSAY PH BODY FLUID NOS: CPT

## 2023-12-27 PROCEDURE — 87800 DETECT AGNT MULT DNA DIREC: CPT

## 2023-12-27 PROCEDURE — 59025 FETAL NON-STRESS TEST: CPT

## 2023-12-27 PROCEDURE — 87591 N.GONORRHOEAE DNA AMP PROB: CPT

## 2023-12-27 NOTE — OB PROVIDER TRIAGE NOTE - HISTORY OF PRESENT ILLNESS
44 yo  at 35w6d GA who presents to L&D for rule out PPROM. Reports having excessive clear odorless wetness in her underwear for the past 3 days. No big gush of fluid. No UTI symptoms. No vaginal odor or itching.   Pregnancy course is significant for:  AMA    POB:  x2, SAB x3  PGYN: -fibroids/-cysts, denied STD hx, denies abnormal PAPs  PMH: Denies  PSH: R breast biopsy (negative)  SH: Denies tobacco use, EtOH use and illicit drug use during the pregnancy; Feels safe at home  Meds: PNV  All: NKDA

## 2023-12-27 NOTE — OB PROVIDER TRIAGE NOTE - NSHPPHYSICALEXAM_GEN_ALL_CORE
T(C): 36.5 (12-27-23 @ 14:33), Max: 36.5 (12-27-23 @ 13:15)  HR: 72 (12-27-23 @ 15:50) (72 - 91)  BP: 100/55 (12-27-23 @ 15:50) (100/55 - 105/67)  RR: 16 (12-27-23 @ 14:33) (16 - 16)  SpO2: --    Gen: NAD, well-appearing  Heart: S1 S2, RRR  Lungs: CTAB  Abd: soft, gravid  Ext: non-edematous, non-tender   SVE: deferred  SSE: cervix visualized, closed and without any signs of bleeding or drainage, no pooling. Copious green clumpy discharge throughout vagina. Nitrazine neg.  FHT: baseline 140, moderate variability, +accels, -decels   [place]: no ctx seen  Sono: vertex, IFTIKHAR 12 T(C): 36.5 (12-27-23 @ 14:33), Max: 36.5 (12-27-23 @ 13:15)  HR: 72 (12-27-23 @ 15:50) (72 - 91)  BP: 100/55 (12-27-23 @ 15:50) (100/55 - 105/67)  RR: 16 (12-27-23 @ 14:33) (16 - 16)  SpO2: --    Gen: NAD, well-appearing  Heart: S1 S2, RRR  Lungs: CTAB  Abd: soft, gravid  Ext: non-edematous, non-tender   SVE: deferred  SSE: cervix visualized, closed and without any signs of bleeding or drainage, no pooling. Copious green clumpy discharge throughout vagina. Nitrazine neg.  FHT: baseline 140, moderate variability, +accels, -decels   [place]: no ctx seen  Sono: vertex, IFTIKHAR 12

## 2023-12-27 NOTE — OB RN TRIAGE NOTE - NSICDXPASTMEDICALHX_GEN_ALL_CORE_FT
Normal PAST MEDICAL HISTORY:  Ectopic pregnancy     No pertinent past medical history      (normal spontaneous vaginal delivery)     Spontaneous miscarriage

## 2023-12-27 NOTE — OB PROVIDER TRIAGE NOTE - NSOBPROVIDERNOTE_OBGYN_ALL_OB_FT
A/P: 42 yo  at 35w6d GA who presents to L&D for rule out PPROM  -VSS  -Nitrazine neg, no pooling  -Irregular discharge seen on exam, may be source of pts complaint of leaking. Affirm and Aptima sent. Terconazole x7 days sent to patient's pharmacy, will call with any other results.  -Sono: IFTIKHAR 12, wnl. vertex.   Fetus: reactive, reassuring  [place]: no ctx seen  Dispo: Stable for d/c home    Discussed with Dr. Morris and Dr Obando A/P: 42 yo  at 35w6d GA who presents to L&D for rule out PPROM  -VSS  -Nitrazine neg, no pooling  -Irregular discharge seen on exam, may be source of pts complaint of leaking. Affirm and Aptima sent. Terconazole x7 days sent to patient's pharmacy, will call with any other results.  -Sono: IFTIKHAR 12, wnl. vertex.   Fetus: reactive, reassuring  [place]: no ctx seen  Dispo: Stable for d/c home    Discussed with Dr. Morris and Dr Obando

## 2023-12-27 NOTE — OB RN TRIAGE NOTE - FALL HARM RISK - UNIVERSAL INTERVENTIONS
Bed in lowest position, wheels locked, appropriate side rails in place/Call bell, personal items and telephone in reach/Instruct patient to call for assistance before getting out of bed or chair/Non-slip footwear when patient is out of bed/[place] to call system/Physically safe environment - no spills, clutter or unnecessary equipment/Purposeful Proactive Rounding/Room/bathroom lighting operational, light cord in reach Bed in lowest position, wheels locked, appropriate side rails in place/Call bell, personal items and telephone in reach/Instruct patient to call for assistance before getting out of bed or chair/Non-slip footwear when patient is out of bed/[place] to call system/Physically safe environment - no spills, clutter or unnecessary equipment/Purposeful Proactive Rounding/Room/bathroom lighting operational, light cord in reach

## 2023-12-28 LAB
C TRACH RRNA SPEC QL NAA+PROBE: SIGNIFICANT CHANGE UP
C TRACH RRNA SPEC QL NAA+PROBE: SIGNIFICANT CHANGE UP
CANDIDA AB TITR SER: DETECTED
CANDIDA AB TITR SER: DETECTED
G VAGINALIS DNA SPEC QL NAA+PROBE: DETECTED
G VAGINALIS DNA SPEC QL NAA+PROBE: DETECTED
N GONORRHOEA RRNA SPEC QL NAA+PROBE: SIGNIFICANT CHANGE UP
N GONORRHOEA RRNA SPEC QL NAA+PROBE: SIGNIFICANT CHANGE UP
T VAGINALIS SPEC QL WET PREP: SIGNIFICANT CHANGE UP
T VAGINALIS SPEC QL WET PREP: SIGNIFICANT CHANGE UP

## 2023-12-29 DIAGNOSIS — Z3A.35 35 WEEKS GESTATION OF PREGNANCY: ICD-10-CM

## 2023-12-29 DIAGNOSIS — O09.523 SUPERVISION OF ELDERLY MULTIGRAVIDA, THIRD TRIMESTER: ICD-10-CM

## 2023-12-29 DIAGNOSIS — Z03.71 ENCOUNTER FOR SUSPECTED PROBLEM WITH AMNIOTIC CAVITY AND MEMBRANE RULED OUT: ICD-10-CM

## 2023-12-29 DIAGNOSIS — O09.293 SUPERVISION OF PREGNANCY WITH OTHER POOR REPRODUCTIVE OR OBSTETRIC HISTORY, THIRD TRIMESTER: ICD-10-CM

## 2023-12-29 DIAGNOSIS — O09.13 SUPERVISION OF PREGNANCY WITH HISTORY OF ECTOPIC PREGNANCY, THIRD TRIMESTER: ICD-10-CM

## 2024-01-03 ENCOUNTER — APPOINTMENT (OUTPATIENT)
Dept: ANTEPARTUM | Facility: CLINIC | Age: 44
End: 2024-01-03
Payer: MEDICAID

## 2024-01-03 ENCOUNTER — ASOB RESULT (OUTPATIENT)
Age: 44
End: 2024-01-03

## 2024-01-03 PROCEDURE — 76816 OB US FOLLOW-UP PER FETUS: CPT

## 2024-01-03 PROCEDURE — 93976 VASCULAR STUDY: CPT

## 2024-01-03 PROCEDURE — 76819 FETAL BIOPHYS PROFIL W/O NST: CPT

## 2024-01-03 PROCEDURE — 76820 UMBILICAL ARTERY ECHO: CPT | Mod: 59

## 2024-01-03 NOTE — CHART NOTE - NSCHARTNOTEFT_GEN_A_CORE
Patient called to inform of + yeast and + BV on vaginal culture. Terconazole already sent to pharmacy, the patient reported that she finished using all days of that medication. Informed patient that metrogel was sent to pharmacy for BV, instructed on use for the next 5 nights. Vaginal discharge is improving.  All questions answered.

## 2024-01-08 ENCOUNTER — APPOINTMENT (OUTPATIENT)
Dept: ANTEPARTUM | Facility: CLINIC | Age: 44
End: 2024-01-08
Payer: MEDICAID

## 2024-01-08 ENCOUNTER — ASOB RESULT (OUTPATIENT)
Age: 44
End: 2024-01-08

## 2024-01-08 PROCEDURE — 93976 VASCULAR STUDY: CPT

## 2024-01-08 PROCEDURE — 76820 UMBILICAL ARTERY ECHO: CPT

## 2024-01-08 PROCEDURE — 76818 FETAL BIOPHYS PROFILE W/NST: CPT

## 2024-01-10 ENCOUNTER — NON-APPOINTMENT (OUTPATIENT)
Age: 44
End: 2024-01-10

## 2024-01-10 ENCOUNTER — APPOINTMENT (OUTPATIENT)
Dept: ANTEPARTUM | Facility: CLINIC | Age: 44
End: 2024-01-10

## 2024-01-11 ENCOUNTER — APPOINTMENT (OUTPATIENT)
Dept: ANTEPARTUM | Facility: CLINIC | Age: 44
End: 2024-01-11
Payer: MEDICAID

## 2024-01-11 ENCOUNTER — APPOINTMENT (OUTPATIENT)
Dept: OBGYN | Facility: CLINIC | Age: 44
End: 2024-01-11
Payer: MEDICAID

## 2024-01-11 ENCOUNTER — ASOB RESULT (OUTPATIENT)
Age: 44
End: 2024-01-11

## 2024-01-11 VITALS
DIASTOLIC BLOOD PRESSURE: 88 MMHG | BODY MASS INDEX: 32.44 KG/M2 | SYSTOLIC BLOOD PRESSURE: 122 MMHG | WEIGHT: 219 LBS | HEIGHT: 69 IN

## 2024-01-11 DIAGNOSIS — O36.5990 MATERNAL CARE FOR OTHER KNOWN OR SUSPECTED POOR FETAL GROWTH, UNSPECIFIED TRIMESTER, NOT APPLICABLE OR UNSPECIFIED: ICD-10-CM

## 2024-01-11 LAB
BILIRUB UR QL STRIP: NORMAL
GLUCOSE UR-MCNC: NORMAL
HCG UR QL: 1 EU/DL
HGB UR QL STRIP.AUTO: ABNORMAL
KETONES UR-MCNC: NORMAL
LEUKOCYTE ESTERASE UR QL STRIP: ABNORMAL
NITRITE UR QL STRIP: NORMAL
PH UR STRIP: 7.5
PROT UR STRIP-MCNC: 30
SP GR UR STRIP: 1.02

## 2024-01-11 PROCEDURE — 81003 URINALYSIS AUTO W/O SCOPE: CPT | Mod: QW

## 2024-01-11 PROCEDURE — 93976 VASCULAR STUDY: CPT

## 2024-01-11 PROCEDURE — 76820 UMBILICAL ARTERY ECHO: CPT

## 2024-01-11 PROCEDURE — 76818 FETAL BIOPHYS PROFILE W/NST: CPT

## 2024-01-11 PROCEDURE — 99213 OFFICE O/P EST LOW 20 MIN: CPT | Mod: TH,25

## 2024-01-11 PROCEDURE — 76821 MIDDLE CEREBRAL ARTERY ECHO: CPT

## 2024-01-15 ENCOUNTER — ASOB RESULT (OUTPATIENT)
Age: 44
End: 2024-01-15

## 2024-01-15 ENCOUNTER — APPOINTMENT (OUTPATIENT)
Dept: ANTEPARTUM | Facility: CLINIC | Age: 44
End: 2024-01-15
Payer: MEDICAID

## 2024-01-15 PROCEDURE — 76818 FETAL BIOPHYS PROFILE W/NST: CPT

## 2024-01-15 PROCEDURE — 76820 UMBILICAL ARTERY ECHO: CPT

## 2024-01-17 ENCOUNTER — APPOINTMENT (OUTPATIENT)
Dept: ANTEPARTUM | Facility: CLINIC | Age: 44
End: 2024-01-17

## 2024-01-17 RX ORDER — OXYTOCIN 10 UNIT/ML
333.33 VIAL (ML) INJECTION
Qty: 20 | Refills: 0 | Status: DISCONTINUED | OUTPATIENT
Start: 2024-01-18 | End: 2024-01-20

## 2024-01-17 RX ORDER — SODIUM CHLORIDE 9 MG/ML
1000 INJECTION, SOLUTION INTRAVENOUS
Refills: 0 | Status: DISCONTINUED | OUTPATIENT
Start: 2024-01-18 | End: 2024-01-18

## 2024-01-17 RX ORDER — CITRIC ACID/SODIUM CITRATE 300-500 MG
30 SOLUTION, ORAL ORAL ONCE
Refills: 0 | Status: DISCONTINUED | OUTPATIENT
Start: 2024-01-18 | End: 2024-01-18

## 2024-01-18 ENCOUNTER — APPOINTMENT (OUTPATIENT)
Dept: ANTEPARTUM | Facility: CLINIC | Age: 44
End: 2024-01-18

## 2024-01-18 ENCOUNTER — TRANSCRIPTION ENCOUNTER (OUTPATIENT)
Age: 44
End: 2024-01-18

## 2024-01-18 ENCOUNTER — INPATIENT (INPATIENT)
Facility: HOSPITAL | Age: 44
LOS: 1 days | Discharge: HOME | End: 2024-01-20
Attending: OBSTETRICS & GYNECOLOGY | Admitting: OBSTETRICS & GYNECOLOGY
Payer: COMMERCIAL

## 2024-01-18 ENCOUNTER — NON-APPOINTMENT (OUTPATIENT)
Age: 44
End: 2024-01-18

## 2024-01-18 ENCOUNTER — APPOINTMENT (OUTPATIENT)
Dept: OBGYN | Facility: HOSPITAL | Age: 44
End: 2024-01-18

## 2024-01-18 VITALS — SYSTOLIC BLOOD PRESSURE: 125 MMHG | DIASTOLIC BLOOD PRESSURE: 68 MMHG | HEART RATE: 98 BPM

## 2024-01-18 DIAGNOSIS — O09.529 SUPERVISION OF ELDERLY MULTIGRAVIDA, UNSPECIFIED TRIMESTER: ICD-10-CM

## 2024-01-18 DIAGNOSIS — Q36.0 CLEFT LIP, BILATERAL: Chronic | ICD-10-CM

## 2024-01-18 LAB
BASOPHILS # BLD AUTO: 0.04 K/UL — SIGNIFICANT CHANGE UP (ref 0–0.2)
BASOPHILS NFR BLD AUTO: 0.4 % — SIGNIFICANT CHANGE UP (ref 0–2)
BLD GP AB SCN SERPL QL: SIGNIFICANT CHANGE UP
EOSINOPHIL # BLD AUTO: 0.1 K/UL — SIGNIFICANT CHANGE UP (ref 0–0.5)
EOSINOPHIL NFR BLD AUTO: 0.9 % — SIGNIFICANT CHANGE UP (ref 0–6)
HCT VFR BLD CALC: 43.3 % — SIGNIFICANT CHANGE UP (ref 34.5–45)
HGB BLD-MCNC: 14.1 G/DL — SIGNIFICANT CHANGE UP (ref 11.5–15.5)
HIV 1 & 2 AB SERPL IA.RAPID: SIGNIFICANT CHANGE UP
IMM GRANULOCYTES NFR BLD AUTO: 0.5 % — SIGNIFICANT CHANGE UP (ref 0–0.9)
LYMPHOCYTES # BLD AUTO: 2.47 K/UL — SIGNIFICANT CHANGE UP (ref 1–3.3)
LYMPHOCYTES # BLD AUTO: 23 % — SIGNIFICANT CHANGE UP (ref 13–44)
MCHC RBC-ENTMCNC: 28.5 PG — SIGNIFICANT CHANGE UP (ref 27–34)
MCHC RBC-ENTMCNC: 32.6 GM/DL — SIGNIFICANT CHANGE UP (ref 32–36)
MCV RBC AUTO: 87.5 FL — SIGNIFICANT CHANGE UP (ref 80–100)
MONOCYTES # BLD AUTO: 0.66 K/UL — SIGNIFICANT CHANGE UP (ref 0–0.9)
MONOCYTES NFR BLD AUTO: 6.1 % — SIGNIFICANT CHANGE UP (ref 2–14)
NEUTROPHILS # BLD AUTO: 7.42 K/UL — HIGH (ref 1.8–7.4)
NEUTROPHILS NFR BLD AUTO: 69.1 % — SIGNIFICANT CHANGE UP (ref 43–77)
PLATELET # BLD AUTO: 222 K/UL — SIGNIFICANT CHANGE UP (ref 150–400)
RBC # BLD: 4.95 M/UL — SIGNIFICANT CHANGE UP (ref 3.8–5.2)
RBC # FLD: 14.6 % — HIGH (ref 10.3–14.5)
WBC # BLD: 10.74 K/UL — HIGH (ref 3.8–10.5)
WBC # FLD AUTO: 10.74 K/UL — HIGH (ref 3.8–10.5)

## 2024-01-18 PROCEDURE — 59409 OBSTETRICAL CARE: CPT | Mod: U9

## 2024-01-18 RX ORDER — DIBUCAINE 1 %
1 OINTMENT (GRAM) RECTAL EVERY 6 HOURS
Refills: 0 | Status: DISCONTINUED | OUTPATIENT
Start: 2024-01-18 | End: 2024-01-20

## 2024-01-18 RX ORDER — PRAMOXINE HYDROCHLORIDE 150 MG/15G
1 AEROSOL, FOAM RECTAL EVERY 4 HOURS
Refills: 0 | Status: DISCONTINUED | OUTPATIENT
Start: 2024-01-18 | End: 2024-01-20

## 2024-01-18 RX ORDER — IBUPROFEN 200 MG
600 TABLET ORAL EVERY 6 HOURS
Refills: 0 | Status: COMPLETED | OUTPATIENT
Start: 2024-01-18 | End: 2024-12-16

## 2024-01-18 RX ORDER — SODIUM CHLORIDE 9 MG/ML
3 INJECTION INTRAMUSCULAR; INTRAVENOUS; SUBCUTANEOUS EVERY 8 HOURS
Refills: 0 | Status: DISCONTINUED | OUTPATIENT
Start: 2024-01-18 | End: 2024-01-20

## 2024-01-18 RX ORDER — OXYTOCIN 10 UNIT/ML
41.67 VIAL (ML) INJECTION
Qty: 20 | Refills: 0 | Status: DISCONTINUED | OUTPATIENT
Start: 2024-01-18 | End: 2024-01-20

## 2024-01-18 RX ORDER — AER TRAVELER 0.5 G/1
1 SOLUTION RECTAL; TOPICAL EVERY 4 HOURS
Refills: 0 | Status: DISCONTINUED | OUTPATIENT
Start: 2024-01-18 | End: 2024-01-20

## 2024-01-18 RX ORDER — KETOROLAC TROMETHAMINE 30 MG/ML
30 SYRINGE (ML) INJECTION ONCE
Refills: 0 | Status: DISCONTINUED | OUTPATIENT
Start: 2024-01-18 | End: 2024-01-18

## 2024-01-18 RX ORDER — SIMETHICONE 80 MG/1
80 TABLET, CHEWABLE ORAL EVERY 4 HOURS
Refills: 0 | Status: DISCONTINUED | OUTPATIENT
Start: 2024-01-18 | End: 2024-01-20

## 2024-01-18 RX ORDER — HYDROCORTISONE 1 %
1 OINTMENT (GRAM) TOPICAL EVERY 6 HOURS
Refills: 0 | Status: DISCONTINUED | OUTPATIENT
Start: 2024-01-18 | End: 2024-01-20

## 2024-01-18 RX ORDER — TETANUS TOXOID, REDUCED DIPHTHERIA TOXOID AND ACELLULAR PERTUSSIS VACCINE, ADSORBED 5; 2.5; 8; 8; 2.5 [IU]/.5ML; [IU]/.5ML; UG/.5ML; UG/.5ML; UG/.5ML
0.5 SUSPENSION INTRAMUSCULAR ONCE
Refills: 0 | Status: COMPLETED | OUTPATIENT
Start: 2024-01-18

## 2024-01-18 RX ORDER — MAGNESIUM HYDROXIDE 400 MG/1
30 TABLET, CHEWABLE ORAL
Refills: 0 | Status: DISCONTINUED | OUTPATIENT
Start: 2024-01-18 | End: 2024-01-20

## 2024-01-18 RX ORDER — LANOLIN
1 OINTMENT (GRAM) TOPICAL EVERY 6 HOURS
Refills: 0 | Status: DISCONTINUED | OUTPATIENT
Start: 2024-01-18 | End: 2024-01-20

## 2024-01-18 RX ORDER — INFLUENZA VIRUS VACCINE 15; 15; 15; 15 UG/.5ML; UG/.5ML; UG/.5ML; UG/.5ML
0.5 SUSPENSION INTRAMUSCULAR ONCE
Refills: 0 | Status: DISCONTINUED | OUTPATIENT
Start: 2024-01-18 | End: 2024-01-20

## 2024-01-18 RX ORDER — OXYCODONE HYDROCHLORIDE 5 MG/1
5 TABLET ORAL
Refills: 0 | Status: DISCONTINUED | OUTPATIENT
Start: 2024-01-18 | End: 2024-01-20

## 2024-01-18 RX ORDER — DIPHENHYDRAMINE HCL 50 MG
25 CAPSULE ORAL EVERY 6 HOURS
Refills: 0 | Status: DISCONTINUED | OUTPATIENT
Start: 2024-01-18 | End: 2024-01-20

## 2024-01-18 RX ORDER — OXYCODONE HYDROCHLORIDE 5 MG/1
5 TABLET ORAL ONCE
Refills: 0 | Status: DISCONTINUED | OUTPATIENT
Start: 2024-01-18 | End: 2024-01-20

## 2024-01-18 RX ORDER — ACETAMINOPHEN 500 MG
975 TABLET ORAL
Refills: 0 | Status: DISCONTINUED | OUTPATIENT
Start: 2024-01-18 | End: 2024-01-20

## 2024-01-18 RX ORDER — BENZOCAINE 10 %
1 GEL (GRAM) MUCOUS MEMBRANE EVERY 6 HOURS
Refills: 0 | Status: DISCONTINUED | OUTPATIENT
Start: 2024-01-18 | End: 2024-01-20

## 2024-01-18 RX ADMIN — Medication 30 MILLIGRAM(S): at 20:57

## 2024-01-18 RX ADMIN — SODIUM CHLORIDE 125 MILLILITER(S): 9 INJECTION, SOLUTION INTRAVENOUS at 21:29

## 2024-01-18 NOTE — OB PROVIDER H&P - ATTENDING COMMENTS
Agree with Dr Daily's assessment and plan.  Pt is a 44 year old  at 39w GA by LMP who presents to L&D for IOL secondary to AMA and h/o FGR  Pt reports no complications during current pregnancy otherwise.    Admit to L+D  Informed consent obtained.   All questions answered.  Will start IOL with vaginal cytotec  analgesia prn  expectant mgmt    CHRIS Martinez (OB hospitalist)

## 2024-01-18 NOTE — OB RN DELIVERY SUMMARY - NS_SEPSISRSKCALC_OBGYN_ALL_OB_FT
EOS calculated successfully. EOS Risk Factor: 0.5/1000 live births ([facility] national incidence); GA=39w;Temp=98.2; ROM=0; GBS='Unknown'; Antibiotics='No antibiotics or any antibiotics < 2 hrs prior to birth'

## 2024-01-18 NOTE — DISCHARGE NOTE OB - CARE PROVIDER_API CALL
Caleb Morris  Obstetrics and Gynecology  [address] [place] [address]  Phone: [phone]  Fax: [phone]  Follow Up Time:

## 2024-01-18 NOTE — OB PROVIDER DELIVERY SUMMARY - NSSELHIDDEN_OBGYN_ALL_OB_FT
[NS_DeliveryRN_OBGYN_ALL_OB_FT:[id number]],[NS_DeliveryAttending1_OBGYN_ALL_OB_FT:[id number]],[NS_DeliveryAssist1_OBGYN_ALL_OB_FT:[id number]=]

## 2024-01-18 NOTE — DISCHARGE NOTE OB - PATIENT PORTAL LINK FT
You can access the FollowMyHealth Patient Portal offered by [facility] by registering at the following website: [email or web address]. By joining [email or web address]’s FollowMyHealth portal, you will also be able to view your health information using other applications (apps) compatible with our system.

## 2024-01-18 NOTE — OB PROVIDER LABOR PROGRESS NOTE - ASSESSMENT
-VSS  -Second vaginal cytotec placed.  -Will transition to pitocin in 3 hours if needed
Cat 1 tracing  - Patient s/p VCytotec   - Found with bulging membranes  - REquesting epidural  - continuous monitoring     D/w Dr. Ferreira

## 2024-01-18 NOTE — OB PROVIDER H&P - NSHPPHYSICALEXAM_GEN_ALL_CORE
HR: 98 (01-18-24 @ 12:03) (98 - 98)  BP: 125/68 (01-18-24 @ 12:03) (125/68 - 125/68)    Gen: NAD, well-appearing   Abd: Soft, gravid  Ext: non-tender, non-edematous  SVE:    Bedside sono: Vertex  FHT: 145 baseline, moderate variability, + accels, -decels  [place]:  No contractions

## 2024-01-18 NOTE — OB RN DELIVERY SUMMARY - BABY A: APGAR 1 MIN COLOR, DELIVERY
Total Hip Replacement Home Discharge Summary    Patient ID:  Pierce Fernandez  1935  80 y.o.  [id number]    Admit date: 8/2/2017    Discharge date and time: 8/4/2017  5:53 PM     Admitting Physician: Dennie Dover, MD     Admission Diagnoses: LEFT HIP ARTHRITIS  Hip arthritis    Discharge Diagnoses: Active Problems:    Hip arthritis (8/2/2017)        Edwar Edgar MD      HOSPITAL COURSE:  Pierce Fernandez was admitted on8/2/2017 and underwent successful total hip replacement. The patient was transferred to the orthopaedic floor in stable condition. The patient received the appropriate therapy while in the hospital.  Venous thrombo-embolic prophylaxis included ASA. The incision site remained clean and dry throughout the hospital stay. The patient remained neurovascularly intact. At the time of discharge, the patient was able to demonstrate an understanding of the explicit discharge precautions and instructions following surgery. Important in Hospital Events : Did well w/ therapy    Post Op complications: None    Cannot display discharge medications since this patient is not currently admitted.       Discharged to: Home    Follow-up : Scheduled for 2 weeks post-op      Signed:  Dennie Dover, MD  8/11/2017  7:37 AM
(1) body pink, extremities blue

## 2024-01-18 NOTE — OB PROVIDER H&P - HISTORY OF PRESENT ILLNESS
Patient is a 44 year old  at 39w GA by LMP who presents to L&D for IOL.     LISA:  24   LMP: 23     Pregnancy course is significant for:   FGR, AC 8%ile, EFW 16%ile   AMA   CT Chest finding – faint linear hypodensity in L pulm artery – artifact vs chronic thin thrombus. Seen by pulm, not on anticoagulation   Depression/anxiety     POB:     FT  x2 (04, 2/15/07), SAB x3, ectopic x1   PGYN: -fibroids/-cysts, denied STD hx, denies abnormal PAPs   PMH: depression, anxiety   PSH: R breast biopsy (negative)   SH: Denies tobacco use, EtOH use and illicit drug use during the pregnancy; Feels safe at home   Meds: PNV   All: NKDA     Ultrasound: vtx, anterior   EFW: 2613g, 16%ile, AC 8%ile (1/3)   BMI: 32.4          Patient is a 44 year old  at 39w GA by LMP who presents to L&D for IOL.     LISA:  24   LMP: 23     Pregnancy course is significant for:   FGR, AC 8%ile, EFW 16%ile   AMA   CT Chest finding – faint linear hypodensity in L pulm artery – artifact vs chronic thin thrombus. Seen by pulm, not on anticoagulation   Depression/anxiety     POB:     FT  x2 (04, 2/15/07), SAB x4, ectopic x1   PGYN: -fibroids/-cysts, denied STD hx, denies abnormal PAPs   PMH: depression, anxiety   PSH: R breast biopsy (negative)   SH: Denies tobacco use, EtOH use and illicit drug use during the pregnancy; Feels safe at home   Meds: PNV   All: NKDA     Ultrasound: vtx, anterior   EFW: 2613g, 16%ile, AC 8%ile (1/3)   BMI: 32.4

## 2024-01-18 NOTE — OB RN DELIVERY SUMMARY - NSSELHIDDEN_OBGYN_ALL_OB_FT
[NS_DeliveryRN_OBGYN_ALL_OB_FT:[id number]],[NS_DeliveryAttending1_OBGYN_ALL_OB_FT:[id number]],[NS_DeliveryAssist1_OBGYN_ALL_OB_FT:[id number]]

## 2024-01-18 NOTE — OB PROVIDER H&P - NSLOWPPHRISK_OBGYN_A_OB
No previous uterine incision/Rene Pregnancy/Less than or equal to 4 previous vaginal births/No known bleeding disorder/No history of postpartum hemorrhage/No other PPH risks indicated

## 2024-01-18 NOTE — OB PROVIDER DELIVERY SUMMARY - NSPROVIDERDELIVERYNOTE_OBGYN_ALL_OB_FT
at  of a live female and Apgars 9/9. Delivered OA, no nuchal cord, clear fluid.     Patient found to be fully dilated. Infant's head delivered with maternal expulsive efforts. Shoulders delivered without difficulty followed by the rest of the body. Nose and mouth were bulb suctioned. Cord clamped and cut after delay. Samples obtained. Baby handed to patient. Placenta delivered spontaneously, intact, 3VC. Fundus firm, minimal bleeding. Perineum and vagina inspected – small 1st degree perineal laceration repaired with 3-0 Vicryl suture. EBL 150cc. Hemostasis noted. Pt tolerated procedure well, in stable condition, recovering in LDR. Infant in LDR. Instrument/sponge count correct x 2 and confirmed by nurse.  at  of a live female and Apgars 9/9. Delivered OA, no nuchal cord, clear fluid.     Patient found to be fully dilated. Infant's head delivered with maternal expulsive efforts. Shoulders delivered without difficulty followed by the rest of the body. Nose and mouth were bulb suctioned. Cord clamped and cut after delay. Samples obtained. Baby handed to patient. Placenta delivered spontaneously, intact, 3VC. Fundus firm, minimal bleeding. Perineum and vagina inspected – small 1st degree perineal laceration repaired with 3-0 Vicryl suture. EBL 150cc. Hemostasis noted. Pt tolerated procedure well, in stable condition, recovering in LDR. Infant in LDR. Instrument/sponge count correct x 2 and confirmed by nurse.    [facility] on-call attending:  resident note reviewed, patient delivered as she was sitting up for epidural placement, uncomplciated delivery   Kerri Ferreira MD

## 2024-01-18 NOTE — DISCHARGE NOTE OB - CARE PLAN
1 Principal Discharge DX:	Delivery normal  Assessment and plan of treatment:	Patient should transition to regular activity level. Resume regular diet. Patient should follow up with her OB for postpartum checkup in 2-3 weeks. Patient should call her doctor sooner if she develops fever or uncontrolled vaginal bleeding. Take medications as directed. Exclusive breast feeding for the first 6 months is recommended. Nothing per vagina for 6 weeks (incl. sex, douching, etc). If you have additional concerns, please inform your provider.

## 2024-01-18 NOTE — DISCHARGE NOTE OB - MATERIALS PROVIDED
Guide to Postpartum Care/Back To Sleep Handout/Shaken Baby Prevention Handout/Birth Certificate Instructions/Tdap Vaccination (VIS Pub Date: January 24, 2012)

## 2024-01-18 NOTE — OB PROVIDER DELIVERY SUMMARY - NSLOWPPHRISK_OBGYN_A_OB
No previous uterine incision/Rene Pregnancy/Less than or equal to 4 previous vaginal births/No known bleeding disorder/No history of postpartum hemorrhage/No other PPH risks indicated No previous uterine incision/Rene Pregnancy/Less than or equal to 4 previous vaginal births/No known bleeding disorder/No history of postpartum hemorrhage

## 2024-01-18 NOTE — OB PROVIDER H&P - ASSESSMENT
A/P: 44 year old  at 39w GA by LMP who presents to L&D for IOL.   -Admit to L&D  -Consent  -Admission labs  -NPO, except ice chips   -IV fluids  -Fetus: Cat I tracing. Continuous toco and fetal monitoring.   -GBS: Unknown, no GBS ppx required   -Analgesia: PRN  -DVT ppx: Ambulate and SCD's while in bed     Discussed with Dr. Fuchs

## 2024-01-19 LAB
HCT VFR BLD CALC: 31.4 % — LOW (ref 34.5–45)
HGB BLD-MCNC: 10.5 G/DL — LOW (ref 11.5–15.5)
HIV 1+2 AB+HIV1 P24 AG SERPL QL IA: SIGNIFICANT CHANGE UP
T PALLIDUM AB TITR SER: NEGATIVE — SIGNIFICANT CHANGE UP

## 2024-01-19 RX ORDER — FLUCONAZOLE 150 MG/1
150 TABLET ORAL ONCE
Refills: 0 | Status: COMPLETED | OUTPATIENT
Start: 2024-01-19 | End: 2024-01-19

## 2024-01-19 RX ORDER — ACETAMINOPHEN 500 MG
3 TABLET ORAL
Qty: 270 | Refills: 0
Start: 2024-01-19 | End: 2024-02-17

## 2024-01-19 RX ORDER — FLUCONAZOLE 150 MG/1
200 TABLET ORAL ONCE
Refills: 0 | Status: DISCONTINUED | OUTPATIENT
Start: 2024-01-19 | End: 2024-01-19

## 2024-01-19 RX ORDER — IBUPROFEN 200 MG
1 TABLET ORAL
Qty: 120 | Refills: 0
Start: 2024-01-19 | End: 2024-02-17

## 2024-01-19 RX ORDER — IBUPROFEN 200 MG
600 TABLET ORAL EVERY 6 HOURS
Refills: 0 | Status: DISCONTINUED | OUTPATIENT
Start: 2024-01-19 | End: 2024-01-20

## 2024-01-19 RX ADMIN — Medication 975 MILLIGRAM(S): at 09:16

## 2024-01-19 RX ADMIN — Medication 975 MILLIGRAM(S): at 00:41

## 2024-01-19 RX ADMIN — Medication 600 MILLIGRAM(S): at 12:44

## 2024-01-19 RX ADMIN — Medication 600 MILLIGRAM(S): at 17:51

## 2024-01-19 RX ADMIN — FLUCONAZOLE 150 MILLIGRAM(S): 150 TABLET ORAL at 17:50

## 2024-01-19 RX ADMIN — Medication 600 MILLIGRAM(S): at 05:01

## 2024-01-19 RX ADMIN — Medication 975 MILLIGRAM(S): at 21:07

## 2024-01-19 RX ADMIN — Medication 1 TABLET(S): at 12:44

## 2024-01-19 NOTE — PROGRESS NOTE ADULT - SUBJECTIVE AND OBJECTIVE BOX
GREGORIO NAVARRETE is a 44y  now PPD#1 s/p spontaneous vaginal delivery at 39w gestation, was IOL for FGR, AMA.     S:    No acute events overnight.   The patient has no complaints.  Pain controlled with current treatment regimen.   She is ambulating without difficulty and tolerating PO.   + flatus/-BM/+ voiding   She endorses appropriate lochia, which is decreasing.   She is breastfeeding without difficulty.   She denies fevers, chills, nausea and vomiting.   She denies lightheadedness, dizziness, palpitations, chest pain and SOB.     O:    T(C): 36.4 (24 @ 05:31), Max: 36.8 (24 @ 12:08)  HR: 74 (24 @ 05:31) (60 - 98)  BP: 107/68 (24 @ 05:31) (88/55 - 125/68)  RR: 16 (24 @ 05:31) (16 - 16)  SpO2: 98% (24 @ 05:31) (93% - 98%)    Gen: NAD, AOx3, resting comfortably on room air   Abdomen:  Soft, non-tender, non-distended  Uterus:  Fundus firm below umbilicus  VE:  Expected lochia  Ext:  b/l LE non-tender                           14.1   10.74 )-----------( 222      ( 2024 12:50 )             43.3

## 2024-01-20 VITALS
TEMPERATURE: 98 F | OXYGEN SATURATION: 94 % | DIASTOLIC BLOOD PRESSURE: 58 MMHG | RESPIRATION RATE: 16 BRPM | SYSTOLIC BLOOD PRESSURE: 98 MMHG | HEART RATE: 64 BPM

## 2024-01-20 PROCEDURE — 86901 BLOOD TYPING SEROLOGIC RH(D): CPT

## 2024-01-20 PROCEDURE — 36415 COLL VENOUS BLD VENIPUNCTURE: CPT

## 2024-01-20 PROCEDURE — 87389 HIV-1 AG W/HIV-1&-2 AB AG IA: CPT

## 2024-01-20 PROCEDURE — 59050 FETAL MONITOR W/REPORT: CPT

## 2024-01-20 PROCEDURE — 86900 BLOOD TYPING SEROLOGIC ABO: CPT

## 2024-01-20 PROCEDURE — 85018 HEMOGLOBIN: CPT

## 2024-01-20 PROCEDURE — 86703 HIV-1/HIV-2 1 RESULT ANTBDY: CPT

## 2024-01-20 PROCEDURE — T1013: CPT

## 2024-01-20 PROCEDURE — 86850 RBC ANTIBODY SCREEN: CPT

## 2024-01-20 PROCEDURE — 86780 TREPONEMA PALLIDUM: CPT

## 2024-01-20 PROCEDURE — 85025 COMPLETE CBC W/AUTO DIFF WBC: CPT

## 2024-01-20 PROCEDURE — 85014 HEMATOCRIT: CPT

## 2024-01-20 RX ORDER — ACETAMINOPHEN 500 MG
3 TABLET ORAL
Qty: 0 | Refills: 0 | DISCHARGE
Start: 2024-01-20

## 2024-01-20 RX ORDER — IBUPROFEN 200 MG
1 TABLET ORAL
Qty: 0 | Refills: 0 | DISCHARGE
Start: 2024-01-20

## 2024-01-20 RX ORDER — TETANUS TOXOID, REDUCED DIPHTHERIA TOXOID AND ACELLULAR PERTUSSIS VACCINE, ADSORBED 5; 2.5; 8; 8; 2.5 [IU]/.5ML; [IU]/.5ML; UG/.5ML; UG/.5ML; UG/.5ML
0.5 SUSPENSION INTRAMUSCULAR ONCE
Refills: 0 | Status: DISCONTINUED | OUTPATIENT
Start: 2024-01-20 | End: 2024-01-20

## 2024-01-20 RX ADMIN — Medication 975 MILLIGRAM(S): at 09:24

## 2024-01-20 RX ADMIN — Medication 1 TABLET(S): at 12:56

## 2024-01-20 RX ADMIN — Medication 600 MILLIGRAM(S): at 12:56

## 2024-01-20 NOTE — PROGRESS NOTE ADULT - ASSESSMENT
A/P:  GREGORIO NAVARRETE is a 44y  now PPD#2 s/p spontaneous vaginal delivery at 39w gestation, was IOL for FGR, AMA.   -Vital signs stable  -Hgb: 14.1 -> 10.5, asymptomatic  -Voiding, tolerating PO  -Advance care as tolerated   -Continue routine postpartum care and education  -Healthy female infant  -Dispo: Pt is stable for discharge home pending attending approval
A/P:  GREGORIO NAVARRETE is a 44y  now PPD#1 s/p spontaneous vaginal delivery at 39w gestation, was IOL for FGR, AMA.   -Vital signs stable  -Hgb: 14.1 -> AM labs pending   -Voiding, tolerating PO  -Advance care as tolerated   -Continue routine postpartum care and education  -Healthy female infant  -Dispo: Anticipate discharge to home tomorrow pending attending approval.

## 2024-01-20 NOTE — PROGRESS NOTE ADULT - ATTENDING COMMENTS
Attending Addendum    I agree with the documentation by the resident physician above.  43 y/o  now PPD#2 status post normal spontaneous vaginal delivery at 39 weeks gestational age.  She underwent an IOL for fetal growth restriction and advanced maternal age. She has no acute complaints this morning. She is recovering well and would like to go home. Plan discharge home today.    Elbert Chen MD

## 2024-01-20 NOTE — PROGRESS NOTE ADULT - SUBJECTIVE AND OBJECTIVE BOX
GREGORIO NAVARRETE is a 44y  now PPD#2 s/p spontaneous vaginal delivery at 39w gestation, was IOL for FGR, AMA.     S:    No acute events overnight. The patient has no complaints.  Pain controlled with current treatment regimen.   She is ambulating without difficulty and tolerating PO.   + flatus/+BM/+ voiding   She endorses appropriate lochia, which is decreasing.   She denies lightheadedness, dizziness, palpitations, chest pain and SOB.     O:    T(F): 97.7 (2024 04:20), Max: 97.7 (2024 15:48)  HR: 64 (2024 04:20) (64 - 69)  BP: 98/58 (2024 04:20) (98/58 - 109/69)  RR: 16 (2024 04:20) (16 - 16)  SpO2: 94% (2024 04:20) (94% - 96%)  Parameters below as of 2024 04:20  Patient On (Oxygen Delivery Method): room air      Gen: NAD, AOx3, resting comfortably on room air   Abdomen:  Soft, non-tender, non-distended  Uterus:  Fundus firm below umbilicus  VE:  Expected lochia  Ext:  b/l LE non-tender     LABS                        14.1   10.74 )-----------( 222      ( 2024 12:50 )             43.3                           10.5   x     )-----------( x        ( 2024 06:46 )             31.4

## 2024-01-22 ENCOUNTER — APPOINTMENT (OUTPATIENT)
Dept: ANTEPARTUM | Facility: CLINIC | Age: 44
End: 2024-01-22

## 2024-01-22 NOTE — CHART NOTE - NSCHARTNOTEFT_GEN_A_CORE
[employer] [facility] Note:    Initial Screening    Date of Initial Screenin24    Patient qualifies for [employer] Program  [ x ] Patient qualifies for [employer] [facility] w/ health condition  [  ] Patient does not qualify for [employer] [facility] w/ no health conditions    Diagnosis that qualifies patient for program  [  ] Hypertension  [  ] Diabetes  [  ] Unintended weight loss  [  ] Obesity  [  ] CHF  [ x ] Other    Additional Comments: postpartum    Current Patient Diet: Regular Diet       Actions Taken:  [  ] Spoke with patient  [ x ] [employer] survey completed  Additional Comments: Provided community resources through [employer]. RD to call for initial consultation.      Non-qualification for [employer] [facility]  [   ] D/C to [facility]  [   ] Referred to Social Work  [  ] Declined [employer] [facility]  [  x ] D/C Before Seen By RD  Participant Phone Number: [phone]  GEORGES Comments:      Discharge Visit  [  ] Initial Screening already completed    Date of D/C:  [  ]  Patient provided with healthy groceries  [  ] Follow Up appointment made  [x ] Other community outreach given  [  ] Help with SNAP application at F/U appointment  [ x ] Patient D/C while RD was unavailable. Will call to schedule pick-up

## 2024-01-23 ENCOUNTER — NON-APPOINTMENT (OUTPATIENT)
Age: 44
End: 2024-01-23

## 2024-01-24 ENCOUNTER — APPOINTMENT (OUTPATIENT)
Dept: ANTEPARTUM | Facility: CLINIC | Age: 44
End: 2024-01-24

## 2024-01-25 ENCOUNTER — APPOINTMENT (OUTPATIENT)
Dept: ANTEPARTUM | Facility: CLINIC | Age: 44
End: 2024-01-25

## 2024-02-21 ENCOUNTER — APPOINTMENT (OUTPATIENT)
Age: 44
End: 2024-02-21

## 2024-02-29 ENCOUNTER — APPOINTMENT (OUTPATIENT)
Dept: OBGYN | Facility: CLINIC | Age: 44
End: 2024-02-29
Payer: MEDICAID

## 2024-02-29 VITALS
BODY MASS INDEX: 30.01 KG/M2 | DIASTOLIC BLOOD PRESSURE: 82 MMHG | HEIGHT: 69 IN | SYSTOLIC BLOOD PRESSURE: 118 MMHG | WEIGHT: 202.6 LBS

## 2024-02-29 DIAGNOSIS — Z30.2 ENCOUNTER FOR STERILIZATION: ICD-10-CM

## 2024-02-29 PROCEDURE — 99213 OFFICE O/P EST LOW 20 MIN: CPT | Mod: TH

## 2024-02-29 NOTE — HISTORY OF PRESENT ILLNESS
[Postpartum Follow Up] : postpartum follow up [Complications:___] : no complications [] : delivered by vaginal delivery [Breastfeeding] : not currently nursing [Intended Contraception] : Intended Contraception: [S/Sx PP Depression] : no signs/symptoms of postpartum depression [Back to Normal] : is back to normal in size [Mild] : mild vaginal bleeding [Normal] : the vagina was normal [Cervix Sample Taken] : cervical sample not taken for a Pap smear [Examination Of The Breasts] : breasts are normal [Doing Well] : is doing well [No Sign of Infection] : is showing no signs of infection [Excellent Pain Control] : has excellent pain control [None] : None

## 2024-03-09 ENCOUNTER — OUTPATIENT (OUTPATIENT)
Dept: OUTPATIENT SERVICES | Facility: HOSPITAL | Age: 44
LOS: 1 days | End: 2024-03-09
Payer: COMMERCIAL

## 2024-03-09 DIAGNOSIS — Q36.0 CLEFT LIP, BILATERAL: Chronic | ICD-10-CM

## 2024-03-09 DIAGNOSIS — Z01.818 ENCOUNTER FOR OTHER PREPROCEDURAL EXAMINATION: ICD-10-CM

## 2024-03-09 LAB
A1C WITH ESTIMATED AVERAGE GLUCOSE RESULT: 5.7 % — HIGH (ref 4–5.6)
ANION GAP SERPL CALC-SCNC: 13 MMOL/L — SIGNIFICANT CHANGE UP (ref 5–17)
BASOPHILS # BLD AUTO: 0.06 K/UL — SIGNIFICANT CHANGE UP (ref 0–0.2)
BASOPHILS NFR BLD AUTO: 0.7 % — SIGNIFICANT CHANGE UP (ref 0–2)
BUN SERPL-MCNC: 10.9 MG/DL — SIGNIFICANT CHANGE UP (ref 8–20)
CALCIUM SERPL-MCNC: 9.1 MG/DL — SIGNIFICANT CHANGE UP (ref 8.4–10.5)
CHLORIDE SERPL-SCNC: 99 MMOL/L — SIGNIFICANT CHANGE UP (ref 96–108)
CO2 SERPL-SCNC: 26 MMOL/L — SIGNIFICANT CHANGE UP (ref 22–29)
CREAT SERPL-MCNC: 0.61 MG/DL — SIGNIFICANT CHANGE UP (ref 0.5–1.3)
EGFR: 113 ML/MIN/1.73M2 — SIGNIFICANT CHANGE UP
EOSINOPHIL # BLD AUTO: 0.33 K/UL — SIGNIFICANT CHANGE UP (ref 0–0.5)
EOSINOPHIL NFR BLD AUTO: 3.7 % — SIGNIFICANT CHANGE UP (ref 0–6)
ESTIMATED AVERAGE GLUCOSE: 117 MG/DL — HIGH (ref 68–114)
GLUCOSE SERPL-MCNC: 82 MG/DL — SIGNIFICANT CHANGE UP (ref 70–99)
HCG SERPL-ACNC: <4 MIU/ML — SIGNIFICANT CHANGE UP
HCT VFR BLD CALC: 37.6 % — SIGNIFICANT CHANGE UP (ref 34.5–45)
HGB BLD-MCNC: 12.3 G/DL — SIGNIFICANT CHANGE UP (ref 11.5–15.5)
IMM GRANULOCYTES NFR BLD AUTO: 0.3 % — SIGNIFICANT CHANGE UP (ref 0–0.9)
LYMPHOCYTES # BLD AUTO: 2.8 K/UL — SIGNIFICANT CHANGE UP (ref 1–3.3)
LYMPHOCYTES # BLD AUTO: 31.3 % — SIGNIFICANT CHANGE UP (ref 13–44)
MCHC RBC-ENTMCNC: 28.7 PG — SIGNIFICANT CHANGE UP (ref 27–34)
MCHC RBC-ENTMCNC: 32.7 GM/DL — SIGNIFICANT CHANGE UP (ref 32–36)
MCV RBC AUTO: 87.9 FL — SIGNIFICANT CHANGE UP (ref 80–100)
MONOCYTES # BLD AUTO: 0.63 K/UL — SIGNIFICANT CHANGE UP (ref 0–0.9)
MONOCYTES NFR BLD AUTO: 7 % — SIGNIFICANT CHANGE UP (ref 2–14)
NEUTROPHILS # BLD AUTO: 5.11 K/UL — SIGNIFICANT CHANGE UP (ref 1.8–7.4)
NEUTROPHILS NFR BLD AUTO: 57 % — SIGNIFICANT CHANGE UP (ref 43–77)
PLATELET # BLD AUTO: 295 K/UL — SIGNIFICANT CHANGE UP (ref 150–400)
POTASSIUM SERPL-MCNC: 4.1 MMOL/L — SIGNIFICANT CHANGE UP (ref 3.5–5.3)
POTASSIUM SERPL-SCNC: 4.1 MMOL/L — SIGNIFICANT CHANGE UP (ref 3.5–5.3)
RBC # BLD: 4.28 M/UL — SIGNIFICANT CHANGE UP (ref 3.8–5.2)
RBC # FLD: 14.5 % — SIGNIFICANT CHANGE UP (ref 10.3–14.5)
SODIUM SERPL-SCNC: 138 MMOL/L — SIGNIFICANT CHANGE UP (ref 135–145)
WBC # BLD: 8.96 K/UL — SIGNIFICANT CHANGE UP (ref 3.8–10.5)
WBC # FLD AUTO: 8.96 K/UL — SIGNIFICANT CHANGE UP (ref 3.8–10.5)

## 2024-03-09 PROCEDURE — 84702 CHORIONIC GONADOTROPIN TEST: CPT

## 2024-03-09 PROCEDURE — 36415 COLL VENOUS BLD VENIPUNCTURE: CPT

## 2024-03-09 PROCEDURE — 85025 COMPLETE CBC W/AUTO DIFF WBC: CPT

## 2024-03-09 PROCEDURE — G0463: CPT

## 2024-03-09 PROCEDURE — 80048 BASIC METABOLIC PNL TOTAL CA: CPT

## 2024-03-09 PROCEDURE — 83036 HEMOGLOBIN GLYCOSYLATED A1C: CPT

## 2024-03-15 ENCOUNTER — APPOINTMENT (OUTPATIENT)
Dept: OBGYN | Facility: AMBULATORY SURGERY CENTER | Age: 44
End: 2024-03-15
Payer: MEDICAID

## 2024-03-15 ENCOUNTER — RESULT REVIEW (OUTPATIENT)
Age: 44
End: 2024-03-15

## 2024-03-15 PROCEDURE — 58661 LAPAROSCOPY REMOVE ADNEXA: CPT

## 2024-03-25 NOTE — ED STATDOCS - NS_ATTENDINGSCRIBE_ED_ALL_ED
Instructions: This plan will send the code FBSD to the PM system.  DO NOT or CHANGE the price.
Price (Do Not Change): 0.00
Detail Level: Simple
I personally performed the service described in the documentation recorded by the scribe in my presence, and it accurately and completely records my words and actions.

## 2024-03-29 ENCOUNTER — APPOINTMENT (OUTPATIENT)
Dept: OBGYN | Facility: CLINIC | Age: 44
End: 2024-03-29
Payer: MEDICAID

## 2024-03-29 VITALS
WEIGHT: 205.9 LBS | DIASTOLIC BLOOD PRESSURE: 70 MMHG | SYSTOLIC BLOOD PRESSURE: 124 MMHG | BODY MASS INDEX: 30.49 KG/M2 | HEIGHT: 69 IN

## 2024-03-29 DIAGNOSIS — F41.9 ANXIETY DISORDER, UNSPECIFIED: ICD-10-CM

## 2024-03-29 DIAGNOSIS — Z01.419 ENCOUNTER FOR GYNECOLOGICAL EXAMINATION (GENERAL) (ROUTINE) W/OUT ABNORMAL FINDINGS: ICD-10-CM

## 2024-03-29 DIAGNOSIS — F32.A ANXIETY DISORDER, UNSPECIFIED: ICD-10-CM

## 2024-03-29 DIAGNOSIS — I27.82 CHRONIC PULMONARY EMBOLISM: ICD-10-CM

## 2024-03-29 DIAGNOSIS — Z12.39 ENCOUNTER FOR OTHER SCREENING FOR MALIGNANT NEOPLASM OF BREAST: ICD-10-CM

## 2024-03-29 PROCEDURE — 99396 PREV VISIT EST AGE 40-64: CPT

## 2024-03-29 RX ORDER — PRENATAL WITH FERROUS FUM AND FOLIC ACID 3080; 920; 120; 400; 22; 1.84; 3; 20; 10; 1; 12; 200; 27; 25; 2 [IU]/1; [IU]/1; MG/1; [IU]/1; MG/1; MG/1; MG/1; MG/1; MG/1; MG/1; UG/1; MG/1; MG/1; MG/1; MG/1
27-1 TABLET ORAL DAILY
Qty: 90 | Refills: 2 | Status: DISCONTINUED | COMMUNITY
Start: 2023-06-12 | End: 2024-03-29

## 2024-03-29 NOTE — PHYSICAL EXAM
[Appropriately responsive] : appropriately responsive [Chaperone Present] : A chaperone was present in the examining room during all aspects of the physical examination [No Acute Distress] : no acute distress [Alert] : alert [Soft] : soft [Non-distended] : non-distended [Non-tender] : non-tender [No Lesions] : no lesions [No HSM] : No HSM [Oriented x3] : oriented x3 [No Mass] : no mass [Examination Of The Breasts] : a normal appearance [No Masses] : no breast masses were palpable [Labia Minora] : normal [Labia Majora] : normal [Normal] : normal [Uterine Adnexae] : normal [No Tenderness] : no tenderness [Nl Sphincter Tone] : normal sphincter tone

## 2024-03-29 NOTE — REVIEW OF SYSTEMS
none  For Constipation :   • Increase your water intake. Drink at least 8 glasses of water daily.  • Try adding fiber to your diet by eating fruits, vegetables and foods that are rich in grains.  • If you do experience constipation, you may take an over-the-counter stool softener/laxative such as [place] Colace, Senekot or  Milk of Magnesia. [Negative] : Endocrine

## 2024-03-29 NOTE — HISTORY OF PRESENT ILLNESS
[Regular Cycle Intervals] : periods have been regular [Frequency: Q ___ days] : menstrual periods occur approximately every [unfilled] days [Y] : Positive pregnancy history [Menarche Age: ____] : age at menarche was [unfilled] [PGxTotal] : 8 [facility] : [facility] [PGHxPremature] : 0 [PGHxAbortions] : 5 [[facility] [PGHxABInduced] : 0 [PGHxABSpont] : 5 [PGHxMultBirths] : 0 [PGHxEctopic] : 0

## 2024-04-02 LAB
CYTOLOGY CVX/VAG DOC THIN PREP: NORMAL
HPV HIGH+LOW RISK DNA PNL CVX: NOT DETECTED

## 2024-08-06 NOTE — ED STATDOCS - ATTESTATION, MLM
Triage call:    [facility] lab called in reporting today capillary INR level >4.5. Venous INR 4.6     I have reviewed and confirmed nurses' notes for patient's medications, allergies, medical history, and surgical history.

## 2025-04-08 NOTE — OB RN DELIVERY SUMMARY - NSBEGANLABOR_OBGYN_A_OB
Bed/Stretcher in lowest position, wheels locked, appropriate side rails in place/Call bell, personal items and telephone in reach/Instruct patient to call for assistance before getting out of bed/chair/stretcher/Non-slip footwear applied when patient is off stretcher/[place] to call system/Physically safe environment - no spills, clutter or unnecessary equipment/Purposeful proactive rounding/Room/bathroom lighting operational, light cord in reach
While in Labor & Delivery

## 2025-04-15 ENCOUNTER — APPOINTMENT (OUTPATIENT)
Dept: OBGYN | Facility: CLINIC | Age: 45
End: 2025-04-15